# Patient Record
Sex: MALE | Race: WHITE | Employment: OTHER | URBAN - METROPOLITAN AREA
[De-identification: names, ages, dates, MRNs, and addresses within clinical notes are randomized per-mention and may not be internally consistent; named-entity substitution may affect disease eponyms.]

---

## 2017-10-31 ENCOUNTER — OFFICE VISIT (OUTPATIENT)
Dept: INTERNAL MEDICINE CLINIC | Facility: CLINIC | Age: 62
End: 2017-10-31

## 2017-10-31 VITALS
WEIGHT: 315 LBS | SYSTOLIC BLOOD PRESSURE: 126 MMHG | HEART RATE: 94 BPM | BODY MASS INDEX: 46.13 KG/M2 | RESPIRATION RATE: 20 BRPM | DIASTOLIC BLOOD PRESSURE: 74 MMHG | HEIGHT: 69.2 IN | TEMPERATURE: 98 F

## 2017-10-31 DIAGNOSIS — I10 ESSENTIAL HYPERTENSION: ICD-10-CM

## 2017-10-31 DIAGNOSIS — Z00.00 ROUTINE PHYSICAL EXAMINATION: Primary | ICD-10-CM

## 2017-10-31 DIAGNOSIS — Z12.11 COLON CANCER SCREENING: ICD-10-CM

## 2017-10-31 DIAGNOSIS — N48.1 BALANITIS: ICD-10-CM

## 2017-10-31 DIAGNOSIS — IMO0001 CLASS 3 OBESITY DUE TO EXCESS CALORIES WITHOUT SERIOUS COMORBIDITY WITH BODY MASS INDEX (BMI) OF 45.0 TO 49.9 IN ADULT: ICD-10-CM

## 2017-10-31 PROCEDURE — 99386 PREV VISIT NEW AGE 40-64: CPT | Performed by: INTERNAL MEDICINE

## 2017-10-31 RX ORDER — POTASSIUM CHLORIDE 20 MEQ/1
20 TABLET, EXTENDED RELEASE ORAL DAILY
COMMUNITY
Start: 2017-10-18 | End: 2018-02-01

## 2017-10-31 RX ORDER — FUROSEMIDE 40 MG/1
40 TABLET ORAL DAILY
COMMUNITY
Start: 2017-10-18 | End: 2018-02-01

## 2017-10-31 RX ORDER — LOSARTAN POTASSIUM AND HYDROCHLOROTHIAZIDE 25; 100 MG/1; MG/1
1 TABLET ORAL DAILY
COMMUNITY
Start: 2017-10-18 | End: 2018-02-01

## 2017-10-31 RX ORDER — FLUCONAZOLE 100 MG/1
100 TABLET ORAL DAILY
Qty: 5 TABLET | Refills: 1 | Status: SHIPPED | OUTPATIENT
Start: 2017-10-31 | End: 2017-11-05

## 2017-10-31 NOTE — PROGRESS NOTES
HPI:    Patient ID: Jovan Garcia is a 63year old male. HPI  Patient is here for his first office visit for physical exam and to establish care for follow-up on chronic medical issues as listed below.   Previously seen by different doctor who wa chills, fatigue and fever. HENT: Negative for hearing loss. Eyes: Negative for visual disturbance. Respiratory: Negative for cough and shortness of breath. Cardiovascular: Negative for chest pain and palpitations.    Gastrointestinal: Negative for Genitourinary Comments: Skin irritation on the shaft of the penis. The penis was somewhat retracted due to the obesity. Unable to fully work expose the head of the penis. The skin was irritated and cracked.    Musculoskeletal: He exhibits no tenderness 5 days of oral fluconazole. I do not think the topical treatment would be successful. Check for diabetes.       Meds This Visit:  No prescriptions requested or ordered in this encounter       Imaging & Referrals:  None         #[id number]

## 2017-11-03 ENCOUNTER — LAB ENCOUNTER (OUTPATIENT)
Dept: LAB | Age: 62
End: 2017-11-03
Attending: INTERNAL MEDICINE
Payer: COMMERCIAL

## 2017-11-03 DIAGNOSIS — Z00.00 ROUTINE PHYSICAL EXAMINATION: ICD-10-CM

## 2017-11-03 PROCEDURE — 80061 LIPID PANEL: CPT

## 2017-11-03 PROCEDURE — 82607 VITAMIN B-12: CPT

## 2017-11-03 PROCEDURE — 36415 COLL VENOUS BLD VENIPUNCTURE: CPT

## 2017-11-03 PROCEDURE — 85025 COMPLETE CBC W/AUTO DIFF WBC: CPT

## 2017-11-03 PROCEDURE — 83036 HEMOGLOBIN GLYCOSYLATED A1C: CPT

## 2017-11-03 PROCEDURE — 84443 ASSAY THYROID STIM HORMONE: CPT

## 2017-11-03 PROCEDURE — 80053 COMPREHEN METABOLIC PANEL: CPT

## 2018-01-22 ENCOUNTER — OFFICE VISIT (OUTPATIENT)
Dept: GASTROENTEROLOGY | Facility: CLINIC | Age: 63
End: 2018-01-22

## 2018-01-22 ENCOUNTER — TELEPHONE (OUTPATIENT)
Dept: GASTROENTEROLOGY | Facility: CLINIC | Age: 63
End: 2018-01-22

## 2018-01-22 VITALS
DIASTOLIC BLOOD PRESSURE: 84 MMHG | SYSTOLIC BLOOD PRESSURE: 129 MMHG | HEIGHT: 69 IN | WEIGHT: 315 LBS | HEART RATE: 82 BPM | BODY MASS INDEX: 46.65 KG/M2

## 2018-01-22 DIAGNOSIS — Z12.11 SCREEN FOR COLON CANCER: Primary | ICD-10-CM

## 2018-01-22 PROCEDURE — 99243 OFF/OP CNSLTJ NEW/EST LOW 30: CPT | Performed by: INTERNAL MEDICINE

## 2018-01-22 PROCEDURE — 99212 OFFICE O/P EST SF 10 MIN: CPT | Performed by: INTERNAL MEDICINE

## 2018-01-22 RX ORDER — POLYETHYLENE GLYCOL 3350, SODIUM CHLORIDE, SODIUM BICARBONATE, POTASSIUM CHLORIDE 420; 11.2; 5.72; 1.48 G/4L; G/4L; G/4L; G/4L
POWDER, FOR SOLUTION ORAL
Qty: 1 BOTTLE | Refills: 0 | Status: SHIPPED | OUTPATIENT
Start: 2018-01-22 | End: 2018-02-01

## 2018-01-22 NOTE — TELEPHONE ENCOUNTER
MACKI: Tiff or Saul Guzman ,Pt will call us back to schedule his Colonoscopy Procedure.     Scheduled for:  Colonoscopy 19649  Provider Name: Jayleen Dietz  Date:    Location:    Sedation:  Mac sedation  Time:    Prep: Split dose Trilyte  Meds/Allergies Reconciled?:

## 2018-01-22 NOTE — H&P
[address] [facility] [address] Gastroenterology                                                                                                  Clinic History and Physical     Pa sub-sternal chest pain  GASTROINTESTINAL:  see HPI  GENITOURINARY:  negative for dysuria or gross hematuria  INTEGUMENT/BREAST:  SKIN:  negative for jaundice   ALLERGIC/IMMUNOLOGIC:  negative for hay fever  ENDOCRINE:  negative for cold intolerance and hea demonstrated understanding], including but not limited to the risks of bleeding, infection, pain, death, as well as the risks of anesthesia and perforation all leading to prolonged hospitalization, surgical intervention, or even death.  I also specifically

## 2018-01-22 NOTE — PATIENT INSTRUCTIONS
1. Schedule colonoscopy with MAC (Screening)    2.  bowel prep from pharmacy (split trilyte)    3. Continue all medications for procedure    4. Read all bowel prep instructions carefully    5.  AVOID seeds, nuts, popcorn, raw fruits and vegetables (c

## 2018-01-24 ENCOUNTER — TELEPHONE (OUTPATIENT)
Dept: GASTROENTEROLOGY | Facility: CLINIC | Age: 63
End: 2018-01-24

## 2018-01-24 DIAGNOSIS — Z12.11 COLON CANCER SCREENING: Primary | ICD-10-CM

## 2018-01-24 NOTE — TELEPHONE ENCOUNTER
GI/RN--    This patient is scheduled (see below) and has [address]. Please call for prior auth, Thank you.

## 2018-01-24 NOTE — TELEPHONE ENCOUNTER
Scheduled for:  Colonoscopy 77515  Provider Name: Dr. Jenkins Room  Date:  2/20/18  Location:  [facility]  Sedation:  MAC  Time:  1500 (pt is aware to arrive at 1400)   Prep:  Trihunterte, mailed new instructions on 1/24/18 with codes  Meds/Allergies Reconciled?:  Physician

## 2018-01-24 NOTE — TELEPHONE ENCOUNTER
Spoke to [address] [facility]. From [place] at [phone] N  CPT 95727 Does not require pre-certification.

## 2018-02-01 ENCOUNTER — OFFICE VISIT (OUTPATIENT)
Dept: INTERNAL MEDICINE CLINIC | Facility: CLINIC | Age: 63
End: 2018-02-01

## 2018-02-01 VITALS
HEIGHT: 69 IN | SYSTOLIC BLOOD PRESSURE: 124 MMHG | HEART RATE: 90 BPM | WEIGHT: 315 LBS | RESPIRATION RATE: 16 BRPM | BODY MASS INDEX: 46.65 KG/M2 | TEMPERATURE: 98 F | DIASTOLIC BLOOD PRESSURE: 84 MMHG

## 2018-02-01 DIAGNOSIS — R73.09 ELEVATED GLUCOSE: ICD-10-CM

## 2018-02-01 DIAGNOSIS — IMO0001 CLASS 3 OBESITY DUE TO EXCESS CALORIES WITHOUT SERIOUS COMORBIDITY WITH BODY MASS INDEX (BMI) OF 45.0 TO 49.9 IN ADULT: ICD-10-CM

## 2018-02-01 DIAGNOSIS — I10 ESSENTIAL HYPERTENSION: Primary | ICD-10-CM

## 2018-02-01 PROCEDURE — 99214 OFFICE O/P EST MOD 30 MIN: CPT | Performed by: INTERNAL MEDICINE

## 2018-02-01 PROCEDURE — 99212 OFFICE O/P EST SF 10 MIN: CPT | Performed by: INTERNAL MEDICINE

## 2018-02-01 RX ORDER — LOSARTAN POTASSIUM AND HYDROCHLOROTHIAZIDE 25; 100 MG/1; MG/1
1 TABLET ORAL DAILY
Qty: 90 TABLET | Refills: 3 | Status: SHIPPED | OUTPATIENT
Start: 2018-02-01 | End: 2018-10-29

## 2018-02-01 NOTE — PROGRESS NOTES
HPI:    Patient ID: Eder Eng is a 63year old male.     HPI  Patient returns to the office today to discuss chronic medical issues which include Patient Active Problem List:     Essential hypertension     BMI 45.0-49.9, adult ([facility] [address]     Class 3 Review of Systems   Constitutional: Negative for chills, fatigue and fever. HENT: Negative for hearing loss. Eyes: Negative for visual disturbance. Respiratory: Negative for cough and shortness of breath.     Cardiovascular: Negativ well as at home. Continue current medications. Follow-up in 6 months.    (R73.09) Elevated glucose  Plan: GLUCOSE, SERUM, HEMOGLOBIN A1C         Blood sugar is elevated back in October. High risk for diabetes given obesity. A1c also elevated.   Discussed

## 2018-02-20 ENCOUNTER — HOSPITAL ENCOUNTER (OUTPATIENT)
Facility: HOSPITAL | Age: 63
Setting detail: HOSPITAL OUTPATIENT SURGERY
Discharge: HOME | End: 2018-02-20
Attending: INTERNAL MEDICINE | Admitting: INTERNAL MEDICINE
Payer: COMMERCIAL

## 2018-02-20 ENCOUNTER — ANESTHESIA (OUTPATIENT)
Dept: ENDOSCOPY | Facility: HOSPITAL | Age: 63
End: 2018-02-20
Payer: COMMERCIAL

## 2018-02-20 ENCOUNTER — SURGERY (OUTPATIENT)
Age: 63
End: 2018-02-20

## 2018-02-20 ENCOUNTER — ANESTHESIA EVENT (OUTPATIENT)
Dept: ENDOSCOPY | Facility: HOSPITAL | Age: 63
End: 2018-02-20
Payer: COMMERCIAL

## 2018-02-20 VITALS
HEART RATE: 69 BPM | WEIGHT: 312 LBS | RESPIRATION RATE: 17 BRPM | OXYGEN SATURATION: 95 % | BODY MASS INDEX: 46.21 KG/M2 | HEIGHT: 69 IN | SYSTOLIC BLOOD PRESSURE: 118 MMHG | TEMPERATURE: 98 F | DIASTOLIC BLOOD PRESSURE: 76 MMHG

## 2018-02-20 DIAGNOSIS — Q43.8 TORTUOUS COLON: ICD-10-CM

## 2018-02-20 DIAGNOSIS — K57.30 DIVERTICULOSIS LARGE INTESTINE W/O PERFORATION OR ABSCESS W/O BLEEDING: ICD-10-CM

## 2018-02-20 DIAGNOSIS — K63.5 POLYP OF TRANSVERSE COLON, UNSPECIFIED TYPE: Primary | ICD-10-CM

## 2018-02-20 DIAGNOSIS — Z12.11 COLON CANCER SCREENING: ICD-10-CM

## 2018-02-20 DIAGNOSIS — K63.5 POLYP OF SIGMOID COLON, UNSPECIFIED TYPE: ICD-10-CM

## 2018-02-20 PROCEDURE — 45385 COLONOSCOPY W/LESION REMOVAL: CPT | Performed by: INTERNAL MEDICINE

## 2018-02-20 PROCEDURE — 0DBL8ZX EXCISION OF TRANSVERSE COLON, VIA NATURAL OR ARTIFICIAL OPENING ENDOSCOPIC, DIAGNOSTIC: ICD-10-PCS | Performed by: INTERNAL MEDICINE

## 2018-02-20 RX ORDER — SODIUM CHLORIDE, SODIUM LACTATE, POTASSIUM CHLORIDE, CALCIUM CHLORIDE 600; 310; 30; 20 MG/100ML; MG/100ML; MG/100ML; MG/100ML
INJECTION, SOLUTION INTRAVENOUS CONTINUOUS
Status: DISCONTINUED | OUTPATIENT
Start: 2018-02-20 | End: 2018-02-20

## 2018-02-20 RX ORDER — NALOXONE HYDROCHLORIDE 0.4 MG/ML
80 INJECTION, SOLUTION INTRAMUSCULAR; INTRAVENOUS; SUBCUTANEOUS AS NEEDED
Status: DISCONTINUED | OUTPATIENT
Start: 2018-02-20 | End: 2018-02-20

## 2018-02-20 RX ORDER — LIDOCAINE HYDROCHLORIDE 10 MG/ML
INJECTION, SOLUTION EPIDURAL; INFILTRATION; INTRACAUDAL; PERINEURAL AS NEEDED
Status: DISCONTINUED | OUTPATIENT
Start: 2018-02-20 | End: 2018-02-20 | Stop reason: SURG

## 2018-02-20 RX ADMIN — LIDOCAINE HYDROCHLORIDE 50 MG: 10 INJECTION, SOLUTION EPIDURAL; INFILTRATION; INTRACAUDAL; PERINEURAL at 15:22:00

## 2018-02-20 RX ADMIN — SODIUM CHLORIDE, SODIUM LACTATE, POTASSIUM CHLORIDE, CALCIUM CHLORIDE: 600; 310; 30; 20 INJECTION, SOLUTION INTRAVENOUS at 15:20:00

## 2018-02-20 NOTE — ANESTHESIA POSTPROCEDURE EVALUATION
Patient: Karley Light    Procedure Summary     Date:  02/20/18 Room / Location:  [address] [place] [address] ENDOSCOPY 05 / [address] [place] [address] ENDOSCOPY    Anesthesia Start:  9389 Anesthesia Stop:  9170    Procedure:  COLONOSCOPY (N/A ) Diagnosis:       Colon cancer screening      (jessica

## 2018-02-20 NOTE — H&P
History & Physical Examination    Patient Name: John Phillips  MRN: [id number]  CSN: [id number]  YOB: 1955    Diagnosis: screening for colon cancer      Prescriptions Prior to Admission:  Losartan Potassium-HCTZ 100-25 MG Oral Tab Julia Lin

## 2018-02-20 NOTE — OPERATIVE REPORT
COLONOSCOPY REPORT    Fatoumata Turpin     1955 Age 63year old   PCP Jose Angel Heaton MD Endoscopist Luis Anderson MD     Date of procedure: 18    Procedure: Colonoscopy w/hot snare polypectomy    Pre-operative diagnosis: Mikel Machado retroflexed view of the rectum revealed small internal hemorrhoids. 5. The colonic mucosa throughout the colon showed normal vascular pattern, without evidence of angioectasias or inflammation. 6. GABE: normal rectal tone, no masses palpated.      Impr

## 2018-02-20 NOTE — ANESTHESIA PREPROCEDURE EVALUATION
Anesthesia PreOp Note    HPI:     Cherrie Portillo is a 63year old male who presents for preoperative consultation requested by: Giovani Rivero MD    Date of Surgery: 2/20/2018    Procedure(s):  COLONOSCOPY  Indication: Colon cancer screening FB  Neck ROM: full  Dental - normal exam     Pulmonary - negative ROS and normal exam   Cardiovascular - normal exam    Neuro/Psych - negative ROS     GI/Hepatic/Renal - negative ROS     Endo/Other - negative ROS   Abdominal              Anesthesia Plan:

## 2018-02-23 ENCOUNTER — TELEPHONE (OUTPATIENT)
Dept: GASTROENTEROLOGY | Facility: CLINIC | Age: 63
End: 2018-02-23

## 2018-02-23 NOTE — TELEPHONE ENCOUNTER
Message   Received:  Today   Message Contents   Edgard De Jesus MD  [facility] Clinical Staff             GI staff: please place recall for colonoscopy in 3 years      Letter mailed and recall entered for 3 years

## 2018-03-09 ENCOUNTER — APPOINTMENT (OUTPATIENT)
Dept: LAB | Age: 63
End: 2018-03-09
Attending: INTERNAL MEDICINE
Payer: COMMERCIAL

## 2018-03-09 DIAGNOSIS — R73.09 ELEVATED GLUCOSE: ICD-10-CM

## 2018-03-09 LAB
GLUCOSE SERPL-MCNC: 97 MG/DL (ref 70–99)
HBA1C MFR BLD: 6.6 % (ref 4–6)

## 2018-03-09 PROCEDURE — 82947 ASSAY GLUCOSE BLOOD QUANT: CPT

## 2018-03-09 PROCEDURE — 36415 COLL VENOUS BLD VENIPUNCTURE: CPT

## 2018-03-09 PROCEDURE — 83036 HEMOGLOBIN GLYCOSYLATED A1C: CPT

## 2018-10-29 ENCOUNTER — OFFICE VISIT (OUTPATIENT)
Dept: INTERNAL MEDICINE CLINIC | Facility: CLINIC | Age: 63
End: 2018-10-29
Payer: COMMERCIAL

## 2018-10-29 VITALS
HEART RATE: 88 BPM | SYSTOLIC BLOOD PRESSURE: 138 MMHG | TEMPERATURE: 99 F | BODY MASS INDEX: 46.65 KG/M2 | HEIGHT: 69 IN | WEIGHT: 315 LBS | RESPIRATION RATE: 20 BRPM | DIASTOLIC BLOOD PRESSURE: 88 MMHG

## 2018-10-29 DIAGNOSIS — R73.09 ELEVATED GLUCOSE: ICD-10-CM

## 2018-10-29 DIAGNOSIS — E66.01 CLASS 3 SEVERE OBESITY WITHOUT SERIOUS COMORBIDITY WITH BODY MASS INDEX (BMI) OF 45.0 TO 49.9 IN ADULT, UNSPECIFIED OBESITY TYPE (HCC): ICD-10-CM

## 2018-10-29 DIAGNOSIS — N48.1 BALANITIS: ICD-10-CM

## 2018-10-29 DIAGNOSIS — Z00.00 ROUTINE PHYSICAL EXAMINATION: Primary | ICD-10-CM

## 2018-10-29 DIAGNOSIS — I10 ESSENTIAL HYPERTENSION: ICD-10-CM

## 2018-10-29 PROCEDURE — 99396 PREV VISIT EST AGE 40-64: CPT | Performed by: INTERNAL MEDICINE

## 2018-10-29 RX ORDER — LOSARTAN POTASSIUM AND HYDROCHLOROTHIAZIDE 25; 100 MG/1; MG/1
1 TABLET ORAL DAILY
Qty: 90 TABLET | Refills: 3 | Status: SHIPPED | OUTPATIENT
Start: 2018-10-29 | End: 2019-10-27

## 2018-10-29 RX ORDER — FLUCONAZOLE 100 MG/1
100 TABLET ORAL DAILY
Qty: 7 TABLET | Refills: 1 | Status: SHIPPED | OUTPATIENT
Start: 2018-10-29 | End: 2018-11-05

## 2018-10-29 NOTE — PROGRESS NOTES
HPI:    Patient ID: Karley Schmidt is a 66year old male. HPI  Patient is here requesting physical exam and follow-up on chronic medical issues as listed below. Last seen in the office earlier this year there are no changes made at that time.   [place]ing [place] History    Tobacco Use      Smoking status: Never Smoker      Smokeless tobacco: Never Used    Alcohol use: Yes      Comment: rarely    Drug use: No           Review of Systems   Constitutional: Negative for chills, fatigue and fever.    HENT: Negative for Genitourinary: Rectum normal, prostate normal and testes normal. Uncircumcised. Genitourinary Comments: Foreskin- irritated and cracked   Musculoskeletal: He exhibits no tenderness. Lymphadenopathy:     He has no cervical adenopathy.         Right: No Losartan Potassium-HCTZ 100-25 MG Oral Tab 90 tablet 3     Sig: Take 1 tablet by mouth daily.        Imaging & Referrals:  None         BB#[id number]

## 2018-11-09 ENCOUNTER — LAB ENCOUNTER (OUTPATIENT)
Dept: LAB | Age: 63
End: 2018-11-09
Attending: INTERNAL MEDICINE
Payer: COMMERCIAL

## 2018-11-09 DIAGNOSIS — Z00.00 ROUTINE PHYSICAL EXAMINATION: ICD-10-CM

## 2018-11-09 PROCEDURE — 36415 COLL VENOUS BLD VENIPUNCTURE: CPT

## 2018-11-09 PROCEDURE — 80053 COMPREHEN METABOLIC PANEL: CPT

## 2018-11-09 PROCEDURE — 83036 HEMOGLOBIN GLYCOSYLATED A1C: CPT

## 2018-11-09 PROCEDURE — 80061 LIPID PANEL: CPT

## 2018-11-09 PROCEDURE — 82607 VITAMIN B-12: CPT

## 2018-11-09 PROCEDURE — 84443 ASSAY THYROID STIM HORMONE: CPT

## 2018-11-09 PROCEDURE — 85025 COMPLETE CBC W/AUTO DIFF WBC: CPT

## 2019-10-29 RX ORDER — LOSARTAN POTASSIUM AND HYDROCHLOROTHIAZIDE 25; 100 MG/1; MG/1
TABLET ORAL
Qty: 90 TABLET | Refills: 1 | Status: SHIPPED | OUTPATIENT
Start: 2019-10-29 | End: 2020-05-25

## 2019-10-30 ENCOUNTER — OFFICE VISIT (OUTPATIENT)
Dept: INTERNAL MEDICINE CLINIC | Facility: CLINIC | Age: 64
End: 2019-10-30
Payer: COMMERCIAL

## 2019-10-30 VITALS
DIASTOLIC BLOOD PRESSURE: 88 MMHG | HEIGHT: 69 IN | RESPIRATION RATE: 20 BRPM | BODY MASS INDEX: 46.65 KG/M2 | HEART RATE: 92 BPM | SYSTOLIC BLOOD PRESSURE: 150 MMHG | TEMPERATURE: 98 F | WEIGHT: 315 LBS

## 2019-10-30 DIAGNOSIS — R73.09 ELEVATED GLUCOSE: ICD-10-CM

## 2019-10-30 DIAGNOSIS — I10 ESSENTIAL HYPERTENSION: ICD-10-CM

## 2019-10-30 DIAGNOSIS — E66.01 CLASS 3 SEVERE OBESITY WITHOUT SERIOUS COMORBIDITY WITH BODY MASS INDEX (BMI) OF 45.0 TO 49.9 IN ADULT, UNSPECIFIED OBESITY TYPE (HCC): ICD-10-CM

## 2019-10-30 DIAGNOSIS — Z23 NEED FOR VACCINATION: ICD-10-CM

## 2019-10-30 DIAGNOSIS — Z00.00 ROUTINE PHYSICAL EXAMINATION: Primary | ICD-10-CM

## 2019-10-30 PROCEDURE — 90471 IMMUNIZATION ADMIN: CPT | Performed by: INTERNAL MEDICINE

## 2019-10-30 PROCEDURE — 90686 IIV4 VACC NO PRSV 0.5 ML IM: CPT | Performed by: INTERNAL MEDICINE

## 2019-10-30 PROCEDURE — 99396 PREV VISIT EST AGE 40-64: CPT | Performed by: INTERNAL MEDICINE

## 2019-10-30 RX ORDER — ATOVAQUONE AND PROGUANIL HYDROCHLORIDE 250; 100 MG/1; MG/1
TABLET, FILM COATED ORAL
Refills: 0 | COMMUNITY
Start: 2019-06-11 | End: 2019-10-30 | Stop reason: ALTCHOICE

## 2019-10-30 RX ORDER — AZITHROMYCIN 500 MG/1
TABLET, FILM COATED ORAL
Refills: 0 | COMMUNITY
Start: 2019-06-11 | End: 2019-10-30 | Stop reason: ALTCHOICE

## 2019-10-30 NOTE — TELEPHONE ENCOUNTER
Refill passed per [facility] protocol.   Hypertensive Medications  Protocol Criteria:  · Appointment scheduled in the past 6 months or in the next 3 months  · BMP or CMP in the past 12 months  · Creatinine result < 2  Recent Outpatient Visits

## 2019-10-30 NOTE — PROGRESS NOTES
HPI:    Patient ID: Juan Rene is a 60year old male. HPI  Patient is here requesting general physical exam and follow-up of hypertension and obesity seen here 1 year ago. Time showed blood sugar 117 and A1c of 6.5.   Never had an official di constipation. Genitourinary: Negative for dysuria, hematuria and sexual dysfunction. Musculoskeletal: Positive for joint pain. Skin: Negative for rash. Neurological: Negative for weakness, numbness and headaches.    Hematological: Does not bruise/bl Neurological: He is alert. No cranial nerve deficit. Coordination normal.   Skin: Skin is warm and dry. No rash noted. Psychiatric: He has a normal mood and affect.  His behavior is normal. Judgment and thought content normal.       Wt Readings from [facility] that in the future     5. Need for vaccination  Vaccination status reviewed.   Given flu shot today.  - FLULAVAL INFLUENZA VACCINE QUAD PRESERVATIVE FREE 0.5 ML      Meds This Visit:  Requested Prescriptions      No prescriptions requested or ordered in thi

## 2019-11-04 ENCOUNTER — LAB ENCOUNTER (OUTPATIENT)
Dept: LAB | Age: 64
End: 2019-11-04
Attending: INTERNAL MEDICINE
Payer: COMMERCIAL

## 2019-11-04 DIAGNOSIS — Z00.00 ROUTINE PHYSICAL EXAMINATION: ICD-10-CM

## 2019-11-04 PROCEDURE — 84443 ASSAY THYROID STIM HORMONE: CPT

## 2019-11-04 PROCEDURE — 80053 COMPREHEN METABOLIC PANEL: CPT

## 2019-11-04 PROCEDURE — 83036 HEMOGLOBIN GLYCOSYLATED A1C: CPT

## 2019-11-04 PROCEDURE — 85025 COMPLETE CBC W/AUTO DIFF WBC: CPT

## 2019-11-04 PROCEDURE — 36415 COLL VENOUS BLD VENIPUNCTURE: CPT

## 2019-11-04 PROCEDURE — 80061 LIPID PANEL: CPT

## 2020-02-07 ENCOUNTER — OFFICE VISIT (OUTPATIENT)
Dept: INTERNAL MEDICINE CLINIC | Facility: CLINIC | Age: 65
End: 2020-02-07
Payer: COMMERCIAL

## 2020-02-07 VITALS
WEIGHT: 315 LBS | DIASTOLIC BLOOD PRESSURE: 92 MMHG | RESPIRATION RATE: 20 BRPM | SYSTOLIC BLOOD PRESSURE: 150 MMHG | BODY MASS INDEX: 46.65 KG/M2 | TEMPERATURE: 98 F | HEART RATE: 90 BPM | HEIGHT: 69 IN

## 2020-02-07 DIAGNOSIS — I10 ESSENTIAL HYPERTENSION: Primary | ICD-10-CM

## 2020-02-07 DIAGNOSIS — R73.09 ELEVATED GLUCOSE: ICD-10-CM

## 2020-02-07 PROCEDURE — 99214 OFFICE O/P EST MOD 30 MIN: CPT | Performed by: INTERNAL MEDICINE

## 2020-02-07 RX ORDER — AMLODIPINE BESYLATE 5 MG/1
5 TABLET ORAL DAILY
Qty: 90 TABLET | Refills: 3 | Status: SHIPPED | OUTPATIENT
Start: 2020-02-07 | End: 2020-04-08

## 2020-02-07 NOTE — PROGRESS NOTES
HPI:    Patient ID: Yarelis Cunha is a 60year old male. HPI  Patient is here for follow-up on chronic medical problems as listed below. Last seen here in October 30. Blood pressure elevated at that time. We did not make any changes.   Full bl dysfunction. Musculoskeletal: Negative for joint pain. Skin: Negative for rash. Neurological: Negative for weakness, numbness and headaches. Hematological: Does not bruise/bleed easily. Psychiatric/Behavioral: Negative for depressed mood.  The pat Continue losartan hydrochlorothiazide. Add amlodipine 5 mg once a day. Work on diet and exercise. Follow-up in 3 months. 2. Elevated glucose  Hold off on any treatment for now. Work on weight loss. No definite diagnosis of diabetes.     3. BMI 45.0-

## 2020-03-11 ENCOUNTER — APPOINTMENT (OUTPATIENT)
Dept: GENERAL RADIOLOGY | Facility: HOSPITAL | Age: 65
End: 2020-03-11
Attending: EMERGENCY MEDICINE
Payer: COMMERCIAL

## 2020-03-11 ENCOUNTER — APPOINTMENT (OUTPATIENT)
Dept: CT IMAGING | Facility: HOSPITAL | Age: 65
End: 2020-03-11
Attending: EMERGENCY MEDICINE
Payer: COMMERCIAL

## 2020-03-11 ENCOUNTER — HOSPITAL ENCOUNTER (OUTPATIENT)
Facility: HOSPITAL | Age: 65
Setting detail: OBSERVATION
Discharge: HOME | End: 2020-03-12
Attending: EMERGENCY MEDICINE | Admitting: HOSPITALIST
Payer: COMMERCIAL

## 2020-03-11 DIAGNOSIS — R07.9 ACUTE CHEST PAIN: Primary | ICD-10-CM

## 2020-03-11 LAB
ANION GAP SERPL CALC-SCNC: 7 MMOL/L (ref 0–18)
BASOPHILS # BLD AUTO: 0.05 X10(3) UL (ref 0–0.2)
BASOPHILS NFR BLD AUTO: 0.5 %
BUN BLD-MCNC: 20 MG/DL (ref 7–18)
BUN/CREAT SERPL: 15.6 (ref 10–20)
CALCIUM BLD-MCNC: 8.8 MG/DL (ref 8.5–10.1)
CHLORIDE SERPL-SCNC: 103 MMOL/L (ref 98–112)
CO2 SERPL-SCNC: 27 MMOL/L (ref 21–32)
CREAT BLD-MCNC: 1.28 MG/DL (ref 0.7–1.3)
D DIMER PPP FEU-MCNC: 0.67 UG/ML FEU (ref ?–0.62)
DEPRECATED RDW RBC AUTO: 43.9 FL (ref 35.1–46.3)
EOSINOPHIL # BLD AUTO: 0.23 X10(3) UL (ref 0–0.7)
EOSINOPHIL NFR BLD AUTO: 2.3 %
ERYTHROCYTE [DISTWIDTH] IN BLOOD BY AUTOMATED COUNT: 13 % (ref 11–15)
GLUCOSE BLD-MCNC: 237 MG/DL (ref 70–99)
HCT VFR BLD AUTO: 44.4 % (ref 39–53)
HGB BLD-MCNC: 15 G/DL (ref 13–17.5)
IMM GRANULOCYTES # BLD AUTO: 0.03 X10(3) UL (ref 0–1)
IMM GRANULOCYTES NFR BLD: 0.3 %
LYMPHOCYTES # BLD AUTO: 1.36 X10(3) UL (ref 1–4)
LYMPHOCYTES NFR BLD AUTO: 13.7 %
MCH RBC QN AUTO: 31.2 PG (ref 26–34)
MCHC RBC AUTO-ENTMCNC: 33.8 G/DL (ref 31–37)
MCV RBC AUTO: 92.3 FL (ref 80–100)
MONOCYTES # BLD AUTO: 0.63 X10(3) UL (ref 0.1–1)
MONOCYTES NFR BLD AUTO: 6.4 %
NEUTROPHILS # BLD AUTO: 7.61 X10 (3) UL (ref 1.5–7.7)
NEUTROPHILS # BLD AUTO: 7.61 X10(3) UL (ref 1.5–7.7)
NEUTROPHILS NFR BLD AUTO: 76.8 %
OSMOLALITY SERPL CALC.SUM OF ELEC: 294 MOSM/KG (ref 275–295)
PLATELET # BLD AUTO: 145 10(3)UL (ref 150–450)
POTASSIUM SERPL-SCNC: 3.6 MMOL/L (ref 3.5–5.1)
RBC # BLD AUTO: 4.81 X10(6)UL (ref 4.3–5.7)
SODIUM SERPL-SCNC: 137 MMOL/L (ref 136–145)
TROPONIN I SERPL-MCNC: <0.045 NG/ML (ref ?–0.04)
WBC # BLD AUTO: 9.9 X10(3) UL (ref 4–11)

## 2020-03-11 PROCEDURE — 71260 CT THORAX DX C+: CPT | Performed by: EMERGENCY MEDICINE

## 2020-03-11 PROCEDURE — 99220 INITIAL OBSERVATION CARE,LEVL III: CPT | Performed by: HOSPITALIST

## 2020-03-11 PROCEDURE — 71046 X-RAY EXAM CHEST 2 VIEWS: CPT | Performed by: EMERGENCY MEDICINE

## 2020-03-11 RX ORDER — ASPIRIN 81 MG/1
324 TABLET, CHEWABLE ORAL ONCE
Status: COMPLETED | OUTPATIENT
Start: 2020-03-11 | End: 2020-03-11

## 2020-03-12 ENCOUNTER — APPOINTMENT (OUTPATIENT)
Dept: NUCLEAR MEDICINE | Facility: HOSPITAL | Age: 65
End: 2020-03-12
Attending: HOSPITALIST
Payer: COMMERCIAL

## 2020-03-12 ENCOUNTER — APPOINTMENT (OUTPATIENT)
Dept: CV DIAGNOSTICS | Facility: HOSPITAL | Age: 65
End: 2020-03-12
Attending: HOSPITALIST
Payer: COMMERCIAL

## 2020-03-12 ENCOUNTER — APPOINTMENT (OUTPATIENT)
Dept: GENERAL RADIOLOGY | Facility: HOSPITAL | Age: 65
End: 2020-03-12
Attending: HOSPITALIST
Payer: COMMERCIAL

## 2020-03-12 VITALS
OXYGEN SATURATION: 96 % | RESPIRATION RATE: 16 BRPM | WEIGHT: 315 LBS | SYSTOLIC BLOOD PRESSURE: 133 MMHG | HEART RATE: 92 BPM | HEIGHT: 69 IN | TEMPERATURE: 99 F | DIASTOLIC BLOOD PRESSURE: 69 MMHG | BODY MASS INDEX: 46.65 KG/M2

## 2020-03-12 LAB
EST. AVERAGE GLUCOSE BLD GHB EST-MCNC: 157 MG/DL (ref 68–126)
GLUCOSE BLDC GLUCOMTR-MCNC: 182 MG/DL (ref 70–99)
GLUCOSE BLDC GLUCOMTR-MCNC: 196 MG/DL (ref 70–99)
HBA1C MFR BLD HPLC: 7.1 % (ref ?–5.7)
TROPONIN I SERPL-MCNC: <0.045 NG/ML (ref ?–0.04)

## 2020-03-12 PROCEDURE — 93017 CV STRESS TEST TRACING ONLY: CPT | Performed by: HOSPITALIST

## 2020-03-12 PROCEDURE — 99217 OBSERVATION CARE DISCHARGE: CPT | Performed by: NURSE PRACTITIONER

## 2020-03-12 PROCEDURE — 78452 HT MUSCLE IMAGE SPECT MULT: CPT | Performed by: HOSPITALIST

## 2020-03-12 PROCEDURE — 74246 X-RAY XM UPR GI TRC 2CNTRST: CPT | Performed by: HOSPITALIST

## 2020-03-12 RX ORDER — HEPARIN SODIUM 5000 [USP'U]/ML
5000 INJECTION, SOLUTION INTRAVENOUS; SUBCUTANEOUS EVERY 12 HOURS
Status: DISCONTINUED | OUTPATIENT
Start: 2020-03-12 | End: 2020-03-12

## 2020-03-12 RX ORDER — ONDANSETRON 2 MG/ML
4 INJECTION INTRAMUSCULAR; INTRAVENOUS EVERY 6 HOURS PRN
Status: DISCONTINUED | OUTPATIENT
Start: 2020-03-12 | End: 2020-03-12

## 2020-03-12 RX ORDER — DEXTROSE MONOHYDRATE 25 G/50ML
50 INJECTION, SOLUTION INTRAVENOUS
Status: DISCONTINUED | OUTPATIENT
Start: 2020-03-12 | End: 2020-03-12

## 2020-03-12 RX ORDER — PANTOPRAZOLE SODIUM 40 MG/1
40 TABLET, DELAYED RELEASE ORAL
Status: DISCONTINUED | OUTPATIENT
Start: 2020-03-12 | End: 2020-03-12

## 2020-03-12 RX ORDER — HYDRALAZINE HYDROCHLORIDE 20 MG/ML
10 INJECTION INTRAMUSCULAR; INTRAVENOUS EVERY 4 HOURS PRN
Status: DISCONTINUED | OUTPATIENT
Start: 2020-03-12 | End: 2020-03-12

## 2020-03-12 RX ORDER — POTASSIUM CHLORIDE 20 MEQ/1
40 TABLET, EXTENDED RELEASE ORAL EVERY 4 HOURS
Status: DISCONTINUED | OUTPATIENT
Start: 2020-03-12 | End: 2020-03-12

## 2020-03-12 RX ORDER — ACETAMINOPHEN 325 MG/1
650 TABLET ORAL EVERY 6 HOURS PRN
Status: DISCONTINUED | OUTPATIENT
Start: 2020-03-12 | End: 2020-03-12

## 2020-03-12 RX ORDER — AMLODIPINE BESYLATE 5 MG/1
5 TABLET ORAL DAILY
Status: DISCONTINUED | OUTPATIENT
Start: 2020-03-12 | End: 2020-03-12

## 2020-03-12 NOTE — ED INITIAL ASSESSMENT (HPI)
Patient presents with chest pain that began Sunday, resolved and returned again today. Patient reports dyspnea with exertion as well.

## 2020-03-12 NOTE — PLAN OF CARE
Pt admitted for CP/SOB over last several days. Troponins negative thus far, d dimer slightly elevated with negative CT for PE. Pt denies CP or SOB at this time on the floor. Stress test ordered for AM, pt aware. All questions answered. . Self care, ambulate

## 2020-03-12 NOTE — ED PROVIDER NOTES
Patient Seen in: [facility] AND [facility] Emergency Department    History   Patient presents with:  Chest Pain  Dyspnea JOANN SOB    Stated Complaint: chest pain     HPI    Is here with a sensation of discomfort in the middle upper part of his chest radiates a bi None (Room air)       Current:/69 (BP Location: Right arm)   Pulse 92   Temp 98.5 °F (36.9 °C) (Oral)   Resp 16   Ht 175.3 cm (5' 9\")   Wt (!) 147.7 kg   SpO2 96%   BMI 48.10 kg/m²         Physical Exam  Constitutional:  Alert, well nourished adult Estimated Average Glucose 157 (*)     All other components within normal limits   POCT GLUCOSE - Abnormal; Notable for the following components:    POC Glucose  196 (*)     All other components within normal limits   POCT GLUCOSE - Abnormal; Notable for th

## 2020-03-12 NOTE — H&P
[facility] Patient Status:  Emergency    1955 MRN [id number]   Location [address] Attending Rodger Smith MD   Hosp Day # 0 PCP Nohelia Payne MD 1 tablet (5 mg total) by mouth daily. Facility-Administered Medications: None       Review of Systems:  Constitutional:  Weakness, Fatigue. Eye:  Negative. Ear/Nose/Mouth/Throat:  Negative.   Respiratory:  Negative  Cardiovascular: Substernal chest d 03/11/2020     03/11/2020    CA 8.8 03/11/2020    DDIMER 0.67 03/11/2020    TROP <0.045 03/11/2020       Imaging:  Xr Chest Pa + Lat Chest (cpt=71046)    Result Date: 3/11/2020  CONCLUSION: Mild linear bibasilar opacity probably mild linear scarring

## 2020-03-12 NOTE — PROGRESS NOTES
[facility]    Progress Note    John Phillips Patient Status:  Observation    1955 MRN [id number]   Location Tracie Bull Attending Cori Courtney MD   Hosp Day # 0 PCP So Ureña MD        Subjective:     C exercise along with lifestyle modifications.   Consider CPAP at night.     Prophylaxis  Subcutaneous heparin     CODE STATUS  Full     Dispo: pending       Results:     Lab Results   Component Value Date    WBC 9.9 03/11/2020    HGB 15.0 03/11/2020    HCT 4 chest.  Few noncalcified pulmonary micro nodules probably represent noncalcified granulomas. 5. Hepatic steatosis. 6. Small hiatal hernia. 7. Coronary artery calcification. No major discrepancy with preliminary [facility] radiology report.   Dictated by (CST

## 2020-03-12 NOTE — ED NOTES
Chest pain starting this evening after dinner. States he felt fine at baseball practice but felt an increasing discomfort across his midsternum after dinner. States it hurts more laying and sitting and when taking deep breaths.

## 2020-03-12 NOTE — CONSULTS
[facility] HOSP - [facility]    Cardiology Consultation  [facility]    Manny Mcintyre Patient Status:  Observation    1955 MRN [id number]   Location [address] [place] [address] Attending Josi Amaro MD   Hosp Day # 0 PCP early heart disease  No family history on file.     Social History  Non-smoker, retired  Patient Guardians:  Not on file    Other Topics            Concern    None on file    Social History Narrative    None on file            Current Medications:  amLODIPi kg)   03/12/20 0053 — — — 101 — — — —   03/12/20 0049 144/69 — — 100 20 96 % — —   03/12/20 0030 127/66 — — 102 21 94 % — —   03/12/20 0015 125/63 — — 103 22 95 % — —   03/12/20 0000 115/61 — — 99 26 93 % — —   03/11/20 2345 141/60 — — 101 26 94 % — —   03 03/11/2020    CO2 27.0 03/11/2020     (H) 03/11/2020    CA 8.8 03/11/2020    ALB 3.8 11/04/2019    ALKPHO 62 11/04/2019    TP 7.3 11/04/2019    AST 18 11/04/2019    ALT 41 11/04/2019    TSH 0.860 11/04/2019    PSA 2.8 11/09/2018    DDIMER 0.67 (H) 0 granulomatous disease in the chest.  Few noncalcified pulmonary micro nodules probably represent noncalcified granulomas. 5. Hepatic steatosis. 6. Small hiatal hernia. 7. Coronary artery calcification.     No major discrepancy with preliminary [facility] radiol

## 2020-03-12 NOTE — ED NOTES
Patient and family updated on plan of care- admission to 510. Report given to [facility]. Patient stable at this time.

## 2020-03-12 NOTE — ED NOTES
Assumed care of patient at [phone]. Patient and family updated on plan of care, awaiting bed for admission. ASA given. Warm blanket provided.

## 2020-03-13 ENCOUNTER — PATIENT OUTREACH (OUTPATIENT)
Dept: CASE MANAGEMENT | Age: 65
End: 2020-03-13

## 2020-03-13 DIAGNOSIS — Z02.9 ENCOUNTERS FOR UNSPECIFIED ADMINISTRATIVE PURPOSE: ICD-10-CM

## 2020-03-13 NOTE — DISCHARGE SUMMARY
[facility]    Discharge Summary    Gertrude Campbell Patient Status:  Observation    1955 MRN [id number]   Location [address] [facility] Attending No att. providers found   Hosp Day # 0 PCP Michelle Humphries MD     Date of Admissio out of 10 at its worst nonradiating in nature.     Hospital Course:   Chest pain possible ACS  Serial troponins have been negative EKG with no acute changes  - stress test normal  - D-Dimer elevated, CT no acute PE  - cards consult, no further workup, does

## 2020-03-16 ENCOUNTER — OFFICE VISIT (OUTPATIENT)
Dept: INTERNAL MEDICINE CLINIC | Facility: CLINIC | Age: 65
End: 2020-03-16
Payer: COMMERCIAL

## 2020-03-16 VITALS
HEIGHT: 69 IN | HEART RATE: 94 BPM | RESPIRATION RATE: 20 BRPM | BODY MASS INDEX: 46.65 KG/M2 | SYSTOLIC BLOOD PRESSURE: 136 MMHG | TEMPERATURE: 99 F | DIASTOLIC BLOOD PRESSURE: 86 MMHG | WEIGHT: 315 LBS

## 2020-03-16 DIAGNOSIS — R07.9 CHEST PAIN, UNSPECIFIED TYPE: Primary | ICD-10-CM

## 2020-03-16 DIAGNOSIS — R73.09 ELEVATED GLUCOSE: ICD-10-CM

## 2020-03-16 DIAGNOSIS — I10 ESSENTIAL HYPERTENSION: ICD-10-CM

## 2020-03-16 PROCEDURE — 1111F DSCHRG MED/CURRENT MED MERGE: CPT | Performed by: INTERNAL MEDICINE

## 2020-03-16 PROCEDURE — 99495 TRANSJ CARE MGMT MOD F2F 14D: CPT | Performed by: INTERNAL MEDICINE

## 2020-03-16 NOTE — PROGRESS NOTES
HPI:    Wyatt Melchor is a 60year old male here today for hospital follow up.    He was discharged from Inpatient hospital, [facility] AND [facility]  to Home   Admission Date: 3/11/20   Discharge Date: 3/12/20  Hospital Discharge Diagnoses (since 2/15/2 about 125-135/80-85. Weight is down a few pounds. Allergies:  He is allergic to bee pollen. Current Meds:  amLODIPine Besylate 5 MG Oral Tab, Take 1 tablet (5 mg total) by mouth daily.   LOSARTAN POTASSIUM-HCTZ 100-25 MG Oral Tab, TAKE 1 TABLET DAILY Exam    Constitutional: He appears well-developed and well-nourished. HENT:   Nose: Nose normal.   Cardiovascular: Normal rate, regular rhythm, normal heart sounds and intact distal pulses. Exam reveals no gallop and no friction rub. No murmur heard. Morbidity, and/or Mortality: moderate    Overall Risk:   moderate    Patient seen within 7 days from date of discharge.      Ellen Spencer MD, 3/16/2020

## 2020-04-06 ENCOUNTER — TELEPHONE (OUTPATIENT)
Dept: INTERNAL MEDICINE CLINIC | Facility: CLINIC | Age: 65
End: 2020-04-06

## 2020-04-06 NOTE — TELEPHONE ENCOUNTER
90 day supply_ Requesting 4 refills    Current Outpatient Medications   Medication Sig Dispense Refill   • amLODIPine Besylate 5 MG Oral Tab Take 1 tablet (5 mg total) by mouth daily.  90 tablet 3

## 2020-04-08 RX ORDER — AMLODIPINE BESYLATE 5 MG/1
5 TABLET ORAL DAILY
Qty: 90 TABLET | Refills: 3 | Status: SHIPPED | OUTPATIENT
Start: 2020-04-08 | End: 2021-03-11

## 2020-04-08 NOTE — TELEPHONE ENCOUNTER
Reordered    90 day supply_ Requesting 4 refills            Current Outpatient Medications   Medication Sig Dispense Refill   • amLODIPine Besylate 5 MG Oral Tab Take 1 tablet (5 mg total) by mouth daily.  90 tablet 3      BOB Manriquez  Working with

## 2020-05-25 RX ORDER — LOSARTAN POTASSIUM AND HYDROCHLOROTHIAZIDE 25; 100 MG/1; MG/1
TABLET ORAL
Qty: 90 TABLET | Refills: 3 | Status: SHIPPED | OUTPATIENT
Start: 2020-05-25 | End: 2021-05-20

## 2020-12-02 ENCOUNTER — PATIENT MESSAGE (OUTPATIENT)
Dept: INTERNAL MEDICINE CLINIC | Facility: CLINIC | Age: 65
End: 2020-12-02

## 2020-12-02 DIAGNOSIS — Z00.00 WELLNESS EXAMINATION: Primary | ICD-10-CM

## 2020-12-02 NOTE — TELEPHONE ENCOUNTER
Dr Kayleen Mane patient's message and advise. Pended labs for approval,last annual blood tests were done on 11/4/2019, there are an existing serum glucose and AIC orders since 3/16/20 BUT dx code for elevated glucose and not for annual wellness.      Chilo

## 2020-12-07 ENCOUNTER — LAB ENCOUNTER (OUTPATIENT)
Dept: LAB | Age: 65
End: 2020-12-07
Attending: NURSE PRACTITIONER
Payer: COMMERCIAL

## 2020-12-07 DIAGNOSIS — Z00.00 WELLNESS EXAMINATION: ICD-10-CM

## 2020-12-07 PROCEDURE — 85025 COMPLETE CBC W/AUTO DIFF WBC: CPT

## 2020-12-07 PROCEDURE — 82043 UR ALBUMIN QUANTITATIVE: CPT

## 2020-12-07 PROCEDURE — 82306 VITAMIN D 25 HYDROXY: CPT

## 2020-12-07 PROCEDURE — 83036 HEMOGLOBIN GLYCOSYLATED A1C: CPT

## 2020-12-07 PROCEDURE — 80061 LIPID PANEL: CPT

## 2020-12-07 PROCEDURE — 84443 ASSAY THYROID STIM HORMONE: CPT

## 2020-12-07 PROCEDURE — 80053 COMPREHEN METABOLIC PANEL: CPT

## 2020-12-07 PROCEDURE — 82570 ASSAY OF URINE CREATININE: CPT

## 2020-12-07 PROCEDURE — 36415 COLL VENOUS BLD VENIPUNCTURE: CPT

## 2020-12-10 NOTE — PROGRESS NOTES
HPI:    Patient ID: Yarelis Cunha is a 63year old male. HPI  Patient is here requesting a physical exam and follow-up on chronic medical issues as listed below. Last seen here in March for hospital follow-up with episode of chest pain.   Work-u POTASSIUM-HCTZ 100-25 MG Oral Tab TAKE 1 TABLET DAILY 90 tablet 3   • amLODIPine Besylate 5 MG Oral Tab Take 1 tablet (5 mg total) by mouth daily.  90 tablet 3     Allergies:  Bee Pollen              SWELLING     PHYSICAL EXAM:   /82 (BP Location: Lef 325 lb 12.8 oz (147.8 kg)  10/30/19 : (!) 321 lb 12.8 oz (146 kg)  10/29/18 : (!) 319 lb 14.4 oz (145.1 kg)            ASSESSMENT/PLAN:   1. Routine physical examination   Physical exam remarkable for obesity. Otherwise normal.  Active issues as below.   H

## 2020-12-11 ENCOUNTER — OFFICE VISIT (OUTPATIENT)
Dept: INTERNAL MEDICINE CLINIC | Facility: CLINIC | Age: 65
End: 2020-12-11
Payer: COMMERCIAL

## 2020-12-11 VITALS
DIASTOLIC BLOOD PRESSURE: 78 MMHG | HEIGHT: 69 IN | HEART RATE: 87 BPM | WEIGHT: 315 LBS | BODY MASS INDEX: 46.65 KG/M2 | SYSTOLIC BLOOD PRESSURE: 124 MMHG

## 2020-12-11 DIAGNOSIS — I10 ESSENTIAL HYPERTENSION: ICD-10-CM

## 2020-12-11 DIAGNOSIS — N48.1 BALANITIS: ICD-10-CM

## 2020-12-11 DIAGNOSIS — Z23 NEED FOR VACCINATION: ICD-10-CM

## 2020-12-11 DIAGNOSIS — R73.03 PREDIABETES: ICD-10-CM

## 2020-12-11 DIAGNOSIS — E55.9 VITAMIN D DEFICIENCY: ICD-10-CM

## 2020-12-11 DIAGNOSIS — Z00.00 ROUTINE PHYSICAL EXAMINATION: Primary | ICD-10-CM

## 2020-12-11 PROBLEM — R07.9 ACUTE CHEST PAIN: Status: RESOLVED | Noted: 2020-03-11 | Resolved: 2020-12-11

## 2020-12-11 PROCEDURE — 99396 PREV VISIT EST AGE 40-64: CPT | Performed by: INTERNAL MEDICINE

## 2020-12-11 PROCEDURE — 90750 HZV VACC RECOMBINANT IM: CPT | Performed by: INTERNAL MEDICINE

## 2020-12-11 PROCEDURE — 3008F BODY MASS INDEX DOCD: CPT | Performed by: INTERNAL MEDICINE

## 2020-12-11 PROCEDURE — 3078F DIAST BP <80 MM HG: CPT | Performed by: INTERNAL MEDICINE

## 2020-12-11 PROCEDURE — 3074F SYST BP LT 130 MM HG: CPT | Performed by: INTERNAL MEDICINE

## 2020-12-11 PROCEDURE — 90471 IMMUNIZATION ADMIN: CPT | Performed by: INTERNAL MEDICINE

## 2020-12-11 RX ORDER — FLUCONAZOLE 100 MG/1
100 TABLET ORAL DAILY
Qty: 5 TABLET | Refills: 0 | Status: SHIPPED | OUTPATIENT
Start: 2020-12-11 | End: 2020-12-16

## 2020-12-13 ENCOUNTER — TELEPHONE (OUTPATIENT)
Dept: INTERNAL MEDICINE CLINIC | Facility: CLINIC | Age: 65
End: 2020-12-13

## 2020-12-21 ENCOUNTER — TELEPHONE (OUTPATIENT)
Dept: GASTROENTEROLOGY | Facility: CLINIC | Age: 65
End: 2020-12-21

## 2020-12-21 NOTE — TELEPHONE ENCOUNTER
----- Message from Jairo Cid, [address] sent at 2/23/2018 11:40 AM CST -----  Regarding: Recall Colon  Recall colon for 3 years per SDK.  Colon done 2/20/18

## 2021-02-11 DIAGNOSIS — Z23 NEED FOR VACCINATION: ICD-10-CM

## 2021-02-14 ENCOUNTER — IMMUNIZATION (OUTPATIENT)
Dept: LAB | Age: 66
End: 2021-02-14
Attending: HOSPITALIST
Payer: COMMERCIAL

## 2021-02-14 DIAGNOSIS — Z23 NEED FOR VACCINATION: Primary | ICD-10-CM

## 2021-02-14 PROCEDURE — 0001A SARSCOV2 VAC 30MCG/0.3ML IM: CPT

## 2021-03-07 ENCOUNTER — IMMUNIZATION (OUTPATIENT)
Dept: LAB | Age: 66
End: 2021-03-07
Attending: HOSPITALIST
Payer: COMMERCIAL

## 2021-03-07 DIAGNOSIS — Z23 NEED FOR VACCINATION: Primary | ICD-10-CM

## 2021-03-07 PROCEDURE — 0002A SARSCOV2 VAC 30MCG/0.3ML IM: CPT

## 2021-03-11 RX ORDER — AMLODIPINE BESYLATE 5 MG/1
TABLET ORAL
Qty: 90 TABLET | Refills: 3 | Status: SHIPPED | OUTPATIENT
Start: 2021-03-11

## 2021-03-19 ENCOUNTER — TELEPHONE (OUTPATIENT)
Dept: GASTROENTEROLOGY | Facility: CLINIC | Age: 66
End: 2021-03-19

## 2021-03-19 ENCOUNTER — OFFICE VISIT (OUTPATIENT)
Dept: GASTROENTEROLOGY | Facility: CLINIC | Age: 66
End: 2021-03-19
Payer: COMMERCIAL

## 2021-03-19 VITALS
BODY MASS INDEX: 46.65 KG/M2 | HEIGHT: 69 IN | DIASTOLIC BLOOD PRESSURE: 84 MMHG | HEART RATE: 96 BPM | WEIGHT: 315 LBS | SYSTOLIC BLOOD PRESSURE: 144 MMHG

## 2021-03-19 DIAGNOSIS — Z12.11 SCREEN FOR COLON CANCER: Primary | ICD-10-CM

## 2021-03-19 PROCEDURE — S0285 CNSLT BEFORE SCREEN COLONOSC: HCPCS | Performed by: INTERNAL MEDICINE

## 2021-03-19 PROCEDURE — 3079F DIAST BP 80-89 MM HG: CPT | Performed by: INTERNAL MEDICINE

## 2021-03-19 PROCEDURE — 3077F SYST BP >= 140 MM HG: CPT | Performed by: INTERNAL MEDICINE

## 2021-03-19 PROCEDURE — 3008F BODY MASS INDEX DOCD: CPT | Performed by: INTERNAL MEDICINE

## 2021-03-19 RX ORDER — SODIUM, POTASSIUM,MAG SULFATES 17.5-3.13G
SOLUTION, RECONSTITUTED, ORAL ORAL
Qty: 1 BOTTLE | Refills: 0 | Status: SHIPPED | OUTPATIENT
Start: 2021-03-19 | End: 2021-12-17 | Stop reason: ALTCHOICE

## 2021-03-19 NOTE — H&P
[address] [facility] [address] Gastroenterology                                                                                                               Reason for consult: h history on file.    Social History: Social History    Tobacco Use      Smoking status: Never Smoker      Smokeless tobacco: Never Used    Vaping Use      Vaping Use: Never used    Alcohol use: Yes      Comment: rarely    Drug use: No       Medications (Acti normal    Labs/Imaging:     Patient's pertinent labs and imaging were reviewed and discussed with patient today.       .  ASSESSMENT/PLAN:   Seamus Russo is a 63year old year-old male with history of obesity, colon polyps, who presents for evaluat sign an informed consent and elected to proceed with colonoscopy with possible intervention [i.e. polypectomy, stent placement, etc.] as indicated. Orders This Visit:  No orders of the defined types were placed in this encounter.       Meds This Visi

## 2021-03-19 NOTE — PATIENT INSTRUCTIONS
1. Schedule colonoscopy with MAC [Diagnosis: screening] - schedule for 60 min    2.   bowel prep from pharmacy (split suprep)  --IF constipated, Buy over the counter dulcolax laxative, and take one tablet daily for 2-3 days prior to drinking the brii

## 2021-03-19 NOTE — TELEPHONE ENCOUNTER
Scheduled for:  Colonoscopy 42243  Provider Name:  Dr. Cristina Stanley  Date:  6/2/2021  Location:  [facility]  Sedation:  MAC  Time:  10:00 am, arrival 9:00 am  Prep:  Suprep  Meds/Allergies Reconciled?:  Physician reviewed  Diagnosis with codes:  Screening for colon canc

## 2021-03-26 ENCOUNTER — NURSE ONLY (OUTPATIENT)
Dept: INTERNAL MEDICINE CLINIC | Facility: CLINIC | Age: 66
End: 2021-03-26
Payer: COMMERCIAL

## 2021-03-26 DIAGNOSIS — Z23 NEED FOR VACCINATION: Primary | ICD-10-CM

## 2021-03-26 PROCEDURE — 90471 IMMUNIZATION ADMIN: CPT | Performed by: INTERNAL MEDICINE

## 2021-03-26 PROCEDURE — 90750 HZV VACC RECOMBINANT IM: CPT | Performed by: INTERNAL MEDICINE

## 2021-03-26 NOTE — PROGRESS NOTES
PtElena Rolle Si in for his 2nd Shingrix inj. Name,  and order was verified. Given in left deltoid and tolerated well.

## 2021-05-20 RX ORDER — LOSARTAN POTASSIUM AND HYDROCHLOROTHIAZIDE 25; 100 MG/1; MG/1
TABLET ORAL
Qty: 90 TABLET | Refills: 1 | Status: SHIPPED | OUTPATIENT
Start: 2021-05-20 | End: 2021-11-16

## 2021-05-30 ENCOUNTER — LAB ENCOUNTER (OUTPATIENT)
Dept: LAB | Facility: HOSPITAL | Age: 66
End: 2021-05-30
Attending: INTERNAL MEDICINE
Payer: COMMERCIAL

## 2021-05-30 DIAGNOSIS — Z01.818 PRE-OP TESTING: ICD-10-CM

## 2021-06-02 ENCOUNTER — ANESTHESIA EVENT (OUTPATIENT)
Dept: ENDOSCOPY | Facility: HOSPITAL | Age: 66
End: 2021-06-02
Payer: COMMERCIAL

## 2021-06-02 ENCOUNTER — HOSPITAL ENCOUNTER (OUTPATIENT)
Facility: HOSPITAL | Age: 66
Setting detail: HOSPITAL OUTPATIENT SURGERY
Discharge: HOME | End: 2021-06-02
Attending: INTERNAL MEDICINE | Admitting: INTERNAL MEDICINE
Payer: COMMERCIAL

## 2021-06-02 ENCOUNTER — ANESTHESIA (OUTPATIENT)
Dept: ENDOSCOPY | Facility: HOSPITAL | Age: 66
End: 2021-06-02
Payer: COMMERCIAL

## 2021-06-02 VITALS
SYSTOLIC BLOOD PRESSURE: 119 MMHG | HEIGHT: 69 IN | OXYGEN SATURATION: 97 % | DIASTOLIC BLOOD PRESSURE: 73 MMHG | TEMPERATURE: 97 F | WEIGHT: 315 LBS | BODY MASS INDEX: 46.65 KG/M2 | HEART RATE: 74 BPM | RESPIRATION RATE: 20 BRPM

## 2021-06-02 DIAGNOSIS — K63.5 SIGMOID POLYP: Primary | ICD-10-CM

## 2021-06-02 DIAGNOSIS — K64.9 HEMORRHOIDS: ICD-10-CM

## 2021-06-02 DIAGNOSIS — Z01.818 PRE-OP TESTING: ICD-10-CM

## 2021-06-02 DIAGNOSIS — Z12.11 SCREEN FOR COLON CANCER: ICD-10-CM

## 2021-06-02 PROCEDURE — 0DBN8ZX EXCISION OF SIGMOID COLON, VIA NATURAL OR ARTIFICIAL OPENING ENDOSCOPIC, DIAGNOSTIC: ICD-10-PCS | Performed by: INTERNAL MEDICINE

## 2021-06-02 PROCEDURE — 45385 COLONOSCOPY W/LESION REMOVAL: CPT | Performed by: INTERNAL MEDICINE

## 2021-06-02 RX ORDER — NALOXONE HYDROCHLORIDE 0.4 MG/ML
80 INJECTION, SOLUTION INTRAMUSCULAR; INTRAVENOUS; SUBCUTANEOUS AS NEEDED
Status: DISCONTINUED | OUTPATIENT
Start: 2021-06-02 | End: 2021-06-02

## 2021-06-02 RX ORDER — SODIUM CHLORIDE, SODIUM LACTATE, POTASSIUM CHLORIDE, CALCIUM CHLORIDE 600; 310; 30; 20 MG/100ML; MG/100ML; MG/100ML; MG/100ML
INJECTION, SOLUTION INTRAVENOUS CONTINUOUS
Status: DISCONTINUED | OUTPATIENT
Start: 2021-06-02 | End: 2021-06-02

## 2021-06-02 RX ORDER — LIDOCAINE HYDROCHLORIDE 10 MG/ML
INJECTION, SOLUTION EPIDURAL; INFILTRATION; INTRACAUDAL; PERINEURAL AS NEEDED
Status: DISCONTINUED | OUTPATIENT
Start: 2021-06-02 | End: 2021-06-02 | Stop reason: SURG

## 2021-06-02 RX ADMIN — SODIUM CHLORIDE, SODIUM LACTATE, POTASSIUM CHLORIDE, CALCIUM CHLORIDE: 600; 310; 30; 20 INJECTION, SOLUTION INTRAVENOUS at 09:54:00

## 2021-06-02 RX ADMIN — SODIUM CHLORIDE, SODIUM LACTATE, POTASSIUM CHLORIDE, CALCIUM CHLORIDE: 600; 310; 30; 20 INJECTION, SOLUTION INTRAVENOUS at 10:22:00

## 2021-06-02 RX ADMIN — LIDOCAINE HYDROCHLORIDE 50 MG: 10 INJECTION, SOLUTION EPIDURAL; INFILTRATION; INTRACAUDAL; PERINEURAL at 09:55:00

## 2021-06-02 NOTE — ANESTHESIA POSTPROCEDURE EVALUATION
Patient: John Phillips    Procedure Summary     Date: 06/02/21 Room / Location: [address] [place] [address] ENDOSCOPY 01 / [address] [place] [address] ENDOSCOPY    Anesthesia Start: 9560 Anesthesia Stop:     Procedure: COLONOSCOPY (N/A ) Diagnosis:       Screen for colon cancer      (Colon polyp

## 2021-06-02 NOTE — H&P
History & Physical Examination    Patient Name: Juliet Chino  MRN: [id number]  CSN: [id number]  YOB: 1955    Diagnosis: screening for colon cancer    LOSARTAN POTASSIUM-HCTZ 100-25 MG Oral Tab, TAKE 1 TABLET DAILY, Disp: 90 tablet, R Gastroenterology  6/2/2021  10:25 AM

## 2021-06-02 NOTE — OR NURSING
After removing the b/p cuff from patient's left arm the antecubital area was noted to e very red with small blisters noted. Advised patient to keep an eye on it today to make sure it did not get worse. Patient  Voiced understanding.

## 2021-06-02 NOTE — OPERATIVE REPORT
COLONOSCOPY REPORT    Delilah Payne     1955 Age 63year old   PCP Charu Flores MD Endoscopist Leanne Medina MD     Date of procedure: 21    Procedure: Colonoscopy w/cold snare polypectomy    Pre-operative diagnosis: Screen internal hemorrhoids. 5. The colonic mucosa throughout the colon showed normal vascular pattern, without evidence of angioectasias or inflammation. 6. GABE: normal rectal tone, no masses palpated. Impression:   · One small colon polyp removed.  Sm

## 2021-06-02 NOTE — ANESTHESIA PREPROCEDURE EVALUATION
Anesthesia PreOp Note    HPI:     Sintia Gonzalez is a 63year old male who presents for preoperative consultation requested by: Emani Santillan MD    Date of Surgery: 6/2/2021    Procedure(s):  COLONOSCOPY  Indication: Screen for colon cancer    Re Years of education: Not on file      Highest education level: Not on file    Occupational History      Not on file    Tobacco Use      Smoking status: Never Smoker      Smokeless tobacco: Never Used    Vaping Use      Vaping Use: Never used    Substance exam    breath sounds clear to auscultation  Cardiovascular - normal exam  (+) hypertension,     ECG reviewed  Rhythm: regular  Rate: normal  ROS comment: Currently denies CP or SOB  CP 3/2020 with negative workup.    Stress test: Normal regadenoson (Lexisc

## 2021-06-12 ENCOUNTER — TELEPHONE (OUTPATIENT)
Dept: GASTROENTEROLOGY | Facility: CLINIC | Age: 66
End: 2021-06-12

## 2021-06-12 NOTE — TELEPHONE ENCOUNTER
I mailed out colonoscopy results letter to pt  Updated health maintenance  Entered into  5 yr CLN recall  Recall colon in 5 years per.  Colon done 6/02/2021    Carisa Mann MD  [facility] Clinical Staff  GI staff: please place recall for colonoscopy in 5 y

## 2021-07-20 ENCOUNTER — TELEPHONE (OUTPATIENT)
Dept: INTERNAL MEDICINE CLINIC | Facility: CLINIC | Age: 66
End: 2021-07-20

## 2021-07-20 NOTE — TELEPHONE ENCOUNTER
Patient contacted office and states that he sees Dr Charisma Moon and will contact the office to have them fax progress notes to Doctor Des Leigh.

## 2021-07-20 NOTE — TELEPHONE ENCOUNTER
Patient due for annual diabetic eye exam, referral pending in EMR JSK please advise. Left message for patient to call office back, please transfer call to ext. [phone] when he calls office back.

## 2021-11-16 RX ORDER — LOSARTAN POTASSIUM AND HYDROCHLOROTHIAZIDE 25; 100 MG/1; MG/1
1 TABLET ORAL DAILY
Qty: 90 TABLET | Refills: 1 | Status: SHIPPED | OUTPATIENT
Start: 2021-11-16

## 2021-11-16 NOTE — TELEPHONE ENCOUNTER
Refilled per [address] [facility] protocol.   Requested Prescriptions   Pending Prescriptions Disp Refills    LOSARTAN-HYDROCHLOROTHIAZIDE 100-25 MG Oral Tab [Pharmacy Med Name: LOSARTAN/ HYDROCHLOROTHIAZIDE TABS 100/25MG] 90 tablet 3     Sig: TAKE 1 TABLET REDD

## 2021-12-17 ENCOUNTER — OFFICE VISIT (OUTPATIENT)
Dept: INTERNAL MEDICINE CLINIC | Facility: CLINIC | Age: 66
End: 2021-12-17
Payer: COMMERCIAL

## 2021-12-17 VITALS
SYSTOLIC BLOOD PRESSURE: 136 MMHG | HEART RATE: 92 BPM | TEMPERATURE: 98 F | BODY MASS INDEX: 46.65 KG/M2 | WEIGHT: 315 LBS | DIASTOLIC BLOOD PRESSURE: 78 MMHG | RESPIRATION RATE: 20 BRPM | HEIGHT: 69 IN

## 2021-12-17 DIAGNOSIS — R73.03 PREDIABETES: ICD-10-CM

## 2021-12-17 DIAGNOSIS — Z00.00 ROUTINE PHYSICAL EXAMINATION: Primary | ICD-10-CM

## 2021-12-17 DIAGNOSIS — I10 ESSENTIAL HYPERTENSION: ICD-10-CM

## 2021-12-17 PROCEDURE — 3075F SYST BP GE 130 - 139MM HG: CPT | Performed by: INTERNAL MEDICINE

## 2021-12-17 PROCEDURE — 3078F DIAST BP <80 MM HG: CPT | Performed by: INTERNAL MEDICINE

## 2021-12-17 PROCEDURE — 99396 PREV VISIT EST AGE 40-64: CPT | Performed by: INTERNAL MEDICINE

## 2021-12-17 PROCEDURE — 3008F BODY MASS INDEX DOCD: CPT | Performed by: INTERNAL MEDICINE

## 2021-12-17 NOTE — PROGRESS NOTES
HPI:    Patient ID: Clayton Hunter is a 61year old male. HPI  Patient is here requesting physical exam. Last seen here 1 year ago. At that time no changes made. Blood work showed evidence of prediabetes. Advised weight loss at the time.  Since th BESYLATE 5 MG Oral Tab TAKE 1 TABLET DAILY 90 tablet 3     Allergies:  Bee Pollen              SWELLING     PHYSICAL EXAM:   /78   Pulse 92   Temp 98 °F (36.7 °C) (Other)   Resp 20   Ht 5' 9\" (1.753 m)   Wt (!) 323 lb (146.5 kg)   BMI 47.70 kg/m² lb 6.4 oz (145.8 kg)  03/16/20 : (!) 321 lb (145.6 kg)  03/12/20 : (!) 325 lb 11.2 oz (147.7 kg)            ASSESSMENT/PLAN:   1. Routine physical examination  Physical exam remarkable for obesity. Active issues as below.   Health maintenance issues review

## 2021-12-21 ENCOUNTER — LAB ENCOUNTER (OUTPATIENT)
Dept: LAB | Age: 66
End: 2021-12-21
Attending: INTERNAL MEDICINE
Payer: COMMERCIAL

## 2021-12-21 DIAGNOSIS — Z00.00 ROUTINE PHYSICAL EXAMINATION: ICD-10-CM

## 2021-12-21 PROCEDURE — 83036 HEMOGLOBIN GLYCOSYLATED A1C: CPT

## 2021-12-21 PROCEDURE — 36415 COLL VENOUS BLD VENIPUNCTURE: CPT

## 2021-12-21 PROCEDURE — 80061 LIPID PANEL: CPT

## 2021-12-21 PROCEDURE — 84439 ASSAY OF FREE THYROXINE: CPT

## 2021-12-21 PROCEDURE — 84443 ASSAY THYROID STIM HORMONE: CPT

## 2021-12-21 PROCEDURE — 82306 VITAMIN D 25 HYDROXY: CPT

## 2021-12-21 PROCEDURE — 80053 COMPREHEN METABOLIC PANEL: CPT

## 2021-12-21 PROCEDURE — 84481 FREE ASSAY (FT-3): CPT

## 2021-12-21 PROCEDURE — 85025 COMPLETE CBC W/AUTO DIFF WBC: CPT

## 2022-01-03 ENCOUNTER — TELEPHONE (OUTPATIENT)
Dept: ENDOCRINOLOGY | Facility: HOSPITAL | Age: 67
End: 2022-01-03

## 2022-01-03 DIAGNOSIS — E11.9 DIABETES MELLITUS, NEW ONSET (HCC): Primary | ICD-10-CM

## 2022-01-03 NOTE — TELEPHONE ENCOUNTER
Paty Lim,  Your patient Naz Hedrick ([birth year]) has been scheduled for Diabetes education on 1/7. His current order states diagnosis of hyperglycemia however lab values indicates he considered  diagnosis of Diabetes with A1c > 6.5%.   Clarification o

## 2022-01-07 ENCOUNTER — HOSPITAL ENCOUNTER (OUTPATIENT)
Dept: ENDOCRINOLOGY | Facility: HOSPITAL | Age: 67
Discharge: HOME | End: 2022-01-07
Attending: NURSE PRACTITIONER
Payer: COMMERCIAL

## 2022-01-07 VITALS — WEIGHT: 315 LBS | BODY MASS INDEX: 48 KG/M2

## 2022-01-07 DIAGNOSIS — E11.9 DIABETES MELLITUS, NEW ONSET (HCC): Primary | ICD-10-CM

## 2022-01-07 RX ORDER — BLOOD SUGAR DIAGNOSTIC
1 STRIP MISCELLANEOUS 2 TIMES DAILY
Qty: 200 STRIP | Refills: 3 | Status: SHIPPED | OUTPATIENT
Start: 2022-01-07 | End: 2022-05-07

## 2022-01-07 RX ORDER — BLOOD-GLUCOSE METER
1 EACH MISCELLANEOUS 2 TIMES DAILY
Qty: 1 KIT | Refills: 0 | Status: SHIPPED | OUTPATIENT
Start: 2022-01-07

## 2022-01-07 RX ORDER — LANCETS 30 GAUGE
1 EACH MISCELLANEOUS 2 TIMES DAILY
Qty: 200 EACH | Refills: 3 | Status: SHIPPED | OUTPATIENT
Start: 2022-01-07 | End: 2022-05-07

## 2022-01-07 NOTE — PROGRESS NOTES
Kelli Castanon : 1955  attended individual initial assessment for Diabetes Education:    Date: 2022         Start time: 7:50 am End time: 8:40 am    HgbA1C (%)   Date Value   2021 8.3 (H)       Wt Readings from Last 1 Encounters: acute complications: taught symptoms of hypoglycemia, hyperglycemia, how to treat low blood sugar (Rule of 15) and actions for lowering high blood glucose levels.      Behavior Change:  Initiated individualized goal setting process and will continue to [facility]

## 2022-02-10 ENCOUNTER — HOSPITAL ENCOUNTER (OUTPATIENT)
Dept: ENDOCRINOLOGY | Facility: HOSPITAL | Age: 67
Discharge: HOME | End: 2022-02-10
Attending: NURSE PRACTITIONER
Payer: COMMERCIAL

## 2022-02-10 VITALS — BODY MASS INDEX: 47 KG/M2 | WEIGHT: 315 LBS

## 2022-02-10 DIAGNOSIS — E11.9 DIABETES MELLITUS, NEW ONSET (HCC): Primary | ICD-10-CM

## 2022-02-10 NOTE — PROGRESS NOTES
Le Richards  : 1955  attended Step 2 Group Diabetes Education Class:     Date: 2/10/2022 Start time: 1:00 pm End time: 2:30 pm    Wt Readings from Last 1 Encounters:  02/10/22 : (!) 318 lb 8 oz   Weight change since start of program: -5.8 lbs    Diabetes Overview:  Pathophysiology, pre-diabetes, A1C results, treatment options for diabetes self-management, types of diabetes, myths and facts, risk factors, benefits of good blood glucose control and psychosocial aspects reviewed. Assessment: Tara Chaudhari is monitoring his blood sugar and keep food records by using a phone jorge. Patient demonstrates self-monitoring and dietary tracking. Healthy Eating:  Reviewed Balanced Plate Method and discussed examples of Balanced Plate Method meal planning. Reviewed macronutrients (carbohydrate, protein, fat) and their impact on blood glucose levels. Introduced benefits of lower fat food choices. Being Active:  Discussed exercise benefits and precautions including its effect on blood glucose levels. Monitoring:  Benefits and options for glucose monitoring, target BG goals, HgbA1C values. Emphasized importance and benefit of dietary tracking. Problem Solving: Prevention, detection and treatment of acute complications:  Discussed signs, symptoms and treatment of hypoglycemia and hyperglycemia. Medication:  Instructed on classes of Diabetes medications available and additional cardiovascular and renal benefits. Behavior Change:  Goals set for nutrition and monitoring of blood glucose. Discussed how habits are formed, identifying cues and triggers. Discussed identifying barriers to action. Discussed how dietary tracking benefits weight loss. Reviewed and updated individual goals and action plan set by patient. Recommendations:  Implement healthy eating habits with portion control and following Balanced Plate Method. Monitor blood glucose as directed.   Attend remaining sessions. Continue to implement individual goals. Written materials provided for all areas covered. Patient verbalized understanding and has no further questions currently.     Joanna Crum RN

## 2022-02-14 RX ORDER — AMLODIPINE BESYLATE 5 MG/1
5 TABLET ORAL DAILY
Qty: 90 TABLET | Refills: 1 | Status: SHIPPED | OUTPATIENT
Start: 2022-02-14 | End: 2022-08-15

## 2022-02-14 NOTE — TELEPHONE ENCOUNTER
Refill passed per [place] [facility] protocol.     Requested Prescriptions   Pending Prescriptions Disp Refills    AMLODIPINE 5 MG Oral Tab [Pharmacy Med Name: AMLODIPINE BESYLATE TABS 5MG] 90 tablet 3     Sig: TAKE 1 TABLET DAILY        Hypertensive Medications Protocol Passed - 2/14/2022 12:29 AM        Passed - CMP or BMP in past 12 months        Passed - Appointment in past 6 or next 3 months        Passed - GFR Non- > 50     Lab Results   Component Value Date    GFRNAA 81 12/21/2021                       Recent Outpatient Visits              1 month ago Routine physical examination    Jose Son MD    Office Visit    10 months ago Need for vaccination    [facility], [address], [place]    Nurse Only    11 months ago Screen for colon cancer    [facility], [address] [facility], Kayla Soto MD    Office Visit    1 year ago Routine physical examination    [facility] [address], Delia Schmitt MD    Office Visit    1 year ago Chest pain, unspecified type    [facility] [address], Delia Schmitt MD    Office Visit            Future Appointments         Provider Department Appt Notes    In 3 days Bang 4     In 1 week Bang 4     In 1 month Bang 4

## 2022-02-17 ENCOUNTER — HOSPITAL ENCOUNTER (OUTPATIENT)
Dept: ENDOCRINOLOGY | Facility: HOSPITAL | Age: 67
Discharge: HOME | End: 2022-02-17
Attending: NURSE PRACTITIONER
Payer: COMMERCIAL

## 2022-02-17 VITALS — BODY MASS INDEX: 47 KG/M2 | WEIGHT: 315 LBS

## 2022-02-17 DIAGNOSIS — E11.9 DIABETES MELLITUS, NEW ONSET (HCC): Primary | ICD-10-CM

## 2022-02-17 NOTE — PROGRESS NOTES
Brandon Crum [facility] : 1955 attended Step 3 Group Diabetes Education Class:    2022   Start time: 1300  End time: 1440    Wt Readings from Last 1 Encounters:  22 : (!) 316 lb          Weight change since start of program: 2.5# down    Blood Glucose: Controlled: Stable but a few FBG are slightly over 130       Healthy Eating:  Reviewed Basic Diet Guidelines as foundation of diabetes meal planning. Reinforced the balanced plate method. Taught nutrition basics defining carbohydrate, protein, and fat. Taught label reading, including an option to count carbohydrate grams or servings. Provided patient with goals for carbohydrate grams/choices for meals and snacks. Discussed sugar substitutes, ETOH and effect on blood glucose. Jeremiah's helpful for smart phones, as well as websites for examining carbohydrate levels provided. Reviewed and reinforced macronutrient's, carbohydrate counting and meal planning. Problem Solving:  Reinforced signs, symptoms, and treatment of hypoglycemia using the Rule of 15. Discussed impact of different food items and portion sizes on blood glucose levels. Discussed blood glucose target of 180 mg/dL, if testing 2 hours post meal.    Monitoring:  Reviewed blood glucose records and importance of tracking foods/blood glucose levels. Reinforced the importance of taking medications as prescribed. Behavior Change:  Reviewed and updated individual goals and action plan set by patient. Addressed barriers to tracking foods/blood glucose levels and following guidelines presented/achieving goals, as a group discussion. Recommendations: Follow individual meal plan recommended by Educator ([address]). Continue implementing individual goals. Attend remaining sessions. Begin implementing carbohydrate counting and continue dietary and blood glucose tracking. Written materials provided for all areas covered.     Patient verbalized understanding and has no further questions currently.       Juni Mendoza RD

## 2022-02-24 ENCOUNTER — HOSPITAL ENCOUNTER (OUTPATIENT)
Dept: ENDOCRINOLOGY | Facility: HOSPITAL | Age: 67
Discharge: HOME | End: 2022-02-24
Attending: NURSE PRACTITIONER
Payer: COMMERCIAL

## 2022-02-24 VITALS — WEIGHT: 311 LBS | BODY MASS INDEX: 46 KG/M2

## 2022-02-24 DIAGNOSIS — E11.9 DIABETES MELLITUS, NEW ONSET (HCC): Primary | ICD-10-CM

## 2022-02-24 NOTE — PROGRESS NOTES
[facility]  : 1955 attended Step 4 Group Diabetes Education Class:     2022  Start time: 1:00 pm End time: 2:30 pm    Wt Readings from Last 1 Encounters:  22 : (!) 311 lb     Weight change since start of program:  -13.4 lbs    Blood Glucose: Controlled: Stable    Reviewed information covered in previous classes including benefits of maintaining good blood glucose control and healthy weight in decreasing diabetes complications. Prevention, detection and treatment of chronic complications:  Reviewed how to reduce risks of complications including eye, foot, dental, renal and cardiovascular. Discussed American Diabetes Association guidelines for testing including eye exam, lipid panels, urine protein tests, dental and foot exams. Discussed risk factors associated with smoking and the importance of smoking cessation. Encouraged to get annual flu shot. Discussed importance of immunizations, vaccinations and guidelines for sick day management. Discussed wearing medical ID. and traveling    Problem Solving:  Discussed signs, symptoms, and prevention of HHNS. Discussed disaster preparedness and traveling with Diabetes. Discussed Diabetes medication assistance and supply management. Healthy Eating:  Reviewed and reinforced macronutrients, carbohydrate counting, and meal planning. Reviewed meal planning methods. Discussed healthy eating approaches for dining out, holidays and special occasions. Provided tips on how family members can support healthy changes in diet. Recommendations:  Monitor blood glucose as directed. Follow individual meal plan and continue dietary tracking. Attend remaining sessions. Continue to implement individual goals. Written materials provided for all areas covered. Patient verbalized understanding and has no further questions.     Lorna Esqueda RN

## 2022-03-31 ENCOUNTER — APPOINTMENT (OUTPATIENT)
Dept: ENDOCRINOLOGY | Facility: HOSPITAL | Age: 67
End: 2022-03-31
Attending: NURSE PRACTITIONER
Payer: COMMERCIAL

## 2022-05-16 RX ORDER — LOSARTAN POTASSIUM AND HYDROCHLOROTHIAZIDE 25; 100 MG/1; MG/1
1 TABLET ORAL DAILY
Qty: 90 TABLET | Refills: 1 | Status: SHIPPED | OUTPATIENT
Start: 2022-05-16 | End: 2022-11-12

## 2022-05-16 NOTE — TELEPHONE ENCOUNTER
Refill passed per [place] [facility] protocol.      Requested Prescriptions   Pending Prescriptions Disp Refills    LOSARTAN-HYDROCHLOROTHIAZIDE 100-25 MG Oral Tab [Pharmacy Med Name: LOSARTAN/ HYDROCHLOROTHIAZIDE TABS 100/25MG] 90 tablet 3     Sig: TAKE 1 TABLET DAILY        Hypertensive Medications Protocol Passed - 5/16/2022 12:23 AM        Passed - CMP or BMP in past 12 months        Passed - Appointment in past 6 or next 3 months        Passed - GFR Non- > 50     Lab Results   Component Value Date    GFRNAA 81 12/21/2021                       [unfilled]      [unfilled]

## 2022-08-15 RX ORDER — AMLODIPINE BESYLATE 5 MG/1
TABLET ORAL
Qty: 90 TABLET | Refills: 3 | Status: SHIPPED | OUTPATIENT
Start: 2022-08-15

## 2022-11-12 RX ORDER — LOSARTAN POTASSIUM AND HYDROCHLOROTHIAZIDE 25; 100 MG/1; MG/1
TABLET ORAL
Qty: 90 TABLET | Refills: 1 | Status: SHIPPED | OUTPATIENT
Start: 2022-11-12

## 2022-12-16 ENCOUNTER — OFFICE VISIT (OUTPATIENT)
Dept: INTERNAL MEDICINE CLINIC | Facility: CLINIC | Age: 67
End: 2022-12-16
Payer: MEDICARE

## 2022-12-16 VITALS
TEMPERATURE: 98 F | WEIGHT: 305 LBS | RESPIRATION RATE: 20 BRPM | HEART RATE: 86 BPM | SYSTOLIC BLOOD PRESSURE: 130 MMHG | HEIGHT: 68.9 IN | BODY MASS INDEX: 45.18 KG/M2 | DIASTOLIC BLOOD PRESSURE: 76 MMHG

## 2022-12-16 DIAGNOSIS — Z00.00 ENCOUNTER FOR ANNUAL HEALTH EXAMINATION: Primary | ICD-10-CM

## 2022-12-16 DIAGNOSIS — N48.1 BALANITIS: ICD-10-CM

## 2022-12-16 DIAGNOSIS — I10 ESSENTIAL HYPERTENSION: ICD-10-CM

## 2022-12-16 DIAGNOSIS — E11.9 TYPE 2 DIABETES MELLITUS WITHOUT COMPLICATION, WITHOUT LONG-TERM CURRENT USE OF INSULIN (HCC): ICD-10-CM

## 2022-12-16 DIAGNOSIS — E66.01 CLASS 3 SEVERE OBESITY WITH SERIOUS COMORBIDITY AND BODY MASS INDEX (BMI) OF 45.0 TO 49.9 IN ADULT, UNSPECIFIED OBESITY TYPE (HCC): ICD-10-CM

## 2022-12-16 DIAGNOSIS — Z23 NEED FOR VACCINATION: ICD-10-CM

## 2022-12-16 DIAGNOSIS — Z12.5 SCREENING FOR PROSTATE CANCER: ICD-10-CM

## 2022-12-20 ENCOUNTER — LAB ENCOUNTER (OUTPATIENT)
Dept: LAB | Age: 67
End: 2022-12-20
Attending: INTERNAL MEDICINE
Payer: MEDICARE

## 2022-12-20 DIAGNOSIS — E11.9 TYPE 2 DIABETES MELLITUS WITHOUT COMPLICATION, WITHOUT LONG-TERM CURRENT USE OF INSULIN (HCC): ICD-10-CM

## 2022-12-20 DIAGNOSIS — Z12.5 SCREENING FOR PROSTATE CANCER: ICD-10-CM

## 2022-12-20 LAB
ALBUMIN SERPL-MCNC: 3.7 G/DL (ref 3.4–5)
ALBUMIN/GLOB SERPL: 1.2 {RATIO} (ref 1–2)
ALP LIVER SERPL-CCNC: 63 U/L
ALT SERPL-CCNC: 34 U/L
ANION GAP SERPL CALC-SCNC: 4 MMOL/L (ref 0–18)
AST SERPL-CCNC: 13 U/L (ref 15–37)
BASOPHILS # BLD AUTO: 0.05 X10(3) UL (ref 0–0.2)
BASOPHILS NFR BLD AUTO: 0.8 %
BILIRUB SERPL-MCNC: 0.7 MG/DL (ref 0.1–2)
BUN BLD-MCNC: 16 MG/DL (ref 7–18)
BUN/CREAT SERPL: 16.5 (ref 10–20)
CALCIUM BLD-MCNC: 9.2 MG/DL (ref 8.5–10.1)
CHLORIDE SERPL-SCNC: 108 MMOL/L (ref 98–112)
CHOLEST SERPL-MCNC: 143 MG/DL (ref ?–200)
CO2 SERPL-SCNC: 28 MMOL/L (ref 21–32)
COMPLEXED PSA SERPL-MCNC: 1.25 NG/ML (ref ?–4)
CREAT BLD-MCNC: 0.97 MG/DL
CREAT UR-SCNC: 228 MG/DL
DEPRECATED RDW RBC AUTO: 46.1 FL (ref 35.1–46.3)
EOSINOPHIL # BLD AUTO: 0.29 X10(3) UL (ref 0–0.7)
EOSINOPHIL NFR BLD AUTO: 4.4 %
ERYTHROCYTE [DISTWIDTH] IN BLOOD BY AUTOMATED COUNT: 12.8 % (ref 11–15)
EST. AVERAGE GLUCOSE BLD GHB EST-MCNC: 163 MG/DL (ref 68–126)
FASTING PATIENT LIPID ANSWER: YES
FASTING STATUS PATIENT QL REPORTED: YES
GFR SERPLBLD BASED ON 1.73 SQ M-ARVRAT: 87 ML/MIN/1.73M2 (ref 60–?)
GLOBULIN PLAS-MCNC: 3.2 G/DL (ref 2.8–4.4)
GLUCOSE BLD-MCNC: 131 MG/DL (ref 70–99)
HBA1C MFR BLD: 7.3 % (ref ?–5.7)
HCT VFR BLD AUTO: 45.2 %
HDLC SERPL-MCNC: 39 MG/DL (ref 40–59)
HGB BLD-MCNC: 14.8 G/DL
IMM GRANULOCYTES # BLD AUTO: 0.01 X10(3) UL (ref 0–1)
IMM GRANULOCYTES NFR BLD: 0.2 %
LDLC SERPL CALC-MCNC: 87 MG/DL (ref ?–100)
LYMPHOCYTES # BLD AUTO: 1.88 X10(3) UL (ref 1–4)
LYMPHOCYTES NFR BLD AUTO: 28.6 %
MCH RBC QN AUTO: 31.4 PG (ref 26–34)
MCHC RBC AUTO-ENTMCNC: 32.7 G/DL (ref 31–37)
MCV RBC AUTO: 95.8 FL
MICROALBUMIN UR-MCNC: 1.53 MG/DL
MICROALBUMIN/CREAT 24H UR-RTO: 6.7 UG/MG (ref ?–30)
MONOCYTES # BLD AUTO: 0.58 X10(3) UL (ref 0.1–1)
MONOCYTES NFR BLD AUTO: 8.8 %
NEUTROPHILS # BLD AUTO: 3.76 X10 (3) UL (ref 1.5–7.7)
NEUTROPHILS # BLD AUTO: 3.76 X10(3) UL (ref 1.5–7.7)
NEUTROPHILS NFR BLD AUTO: 57.2 %
NONHDLC SERPL-MCNC: 104 MG/DL (ref ?–130)
OSMOLALITY SERPL CALC.SUM OF ELEC: 293 MOSM/KG (ref 275–295)
PLATELET # BLD AUTO: 174 10(3)UL (ref 150–450)
POTASSIUM SERPL-SCNC: 3.7 MMOL/L (ref 3.5–5.1)
PROT SERPL-MCNC: 6.9 G/DL (ref 6.4–8.2)
RBC # BLD AUTO: 4.72 X10(6)UL
SODIUM SERPL-SCNC: 140 MMOL/L (ref 136–145)
T3FREE SERPL-MCNC: 2.64 PG/ML (ref 2.4–4.2)
T4 FREE SERPL-MCNC: 1.2 NG/DL (ref 0.8–1.7)
TRIGL SERPL-MCNC: 89 MG/DL (ref 30–149)
TSI SER-ACNC: 0.32 MIU/ML (ref 0.36–3.74)
VLDLC SERPL CALC-MCNC: 14 MG/DL (ref 0–30)
WBC # BLD AUTO: 6.6 X10(3) UL (ref 4–11)

## 2022-12-20 PROCEDURE — 84443 ASSAY THYROID STIM HORMONE: CPT

## 2022-12-20 PROCEDURE — 80061 LIPID PANEL: CPT

## 2022-12-20 PROCEDURE — 83036 HEMOGLOBIN GLYCOSYLATED A1C: CPT

## 2022-12-20 PROCEDURE — 84481 FREE ASSAY (FT-3): CPT

## 2022-12-20 PROCEDURE — 84439 ASSAY OF FREE THYROXINE: CPT

## 2022-12-20 PROCEDURE — 36415 COLL VENOUS BLD VENIPUNCTURE: CPT

## 2022-12-20 PROCEDURE — 85025 COMPLETE CBC W/AUTO DIFF WBC: CPT

## 2022-12-20 PROCEDURE — 80053 COMPREHEN METABOLIC PANEL: CPT

## 2022-12-20 PROCEDURE — 82043 UR ALBUMIN QUANTITATIVE: CPT

## 2022-12-20 PROCEDURE — 82570 ASSAY OF URINE CREATININE: CPT

## 2023-05-10 RX ORDER — LOSARTAN POTASSIUM AND HYDROCHLOROTHIAZIDE 25; 100 MG/1; MG/1
1 TABLET ORAL DAILY
Qty: 90 TABLET | Refills: 3 | Status: SHIPPED | OUTPATIENT
Start: 2023-05-10

## 2023-05-10 NOTE — TELEPHONE ENCOUNTER
Refill passed per [employer] protocol    Requested Prescriptions   Pending Prescriptions Disp Refills    LOSARTAN-HYDROCHLOROTHIAZIDE 100-25 MG Oral Tab [Pharmacy Med Name: LOSARTAN/ HYDROCHLOROTHIAZIDE TABS 100/25MG] 90 tablet 3     Sig: TAKE 1 TABLET DAILY       Hypertensive Medications Protocol Passed - 5/9/2023 11:36 PM        Passed - In person appointment in the past 12 or next 3 months     Recent Outpatient Visits              4 months ago Encounter for annual health examination    [address] [facility], Delia Schmitt MD    Office Visit    1 year ago Routine physical examination    [address] [facility], Delia Schmitt MD    Office Visit    2 years ago Need for vaccination    [address], [address], [place]    Nurse Only    2 years ago Screen for colon cancer    [address], [address] [facility], Ron Negrete MD    Office Visit    2 years ago Routine physical examination    [address] [facility]    Office Visit                      Passed - Last BP reading less than 140/90     BP Readings from Last 1 Encounters:  12/16/22 : 130/76                Passed - CMP or BMP in past 6 months     Recent Results (from the past 4392 hour(s))   COMP METABOLIC PANEL (14)    Collection Time: 12/20/22  8:38 AM   Result Value Ref Range    Glucose 131 (H) 70 - 99 mg/dL    Sodium 140 136 - 145 mmol/L    Potassium 3.7 3.5 - 5.1 mmol/L    Chloride 108 98 - 112 mmol/L    CO2 28.0 21.0 - 32.0 mmol/L    Anion Gap 4 0 - 18 mmol/L    BUN 16 7 - 18 mg/dL    Creatinine 0.97 0.70 - 1.30 mg/dL    BUN/CREA Ratio 16.5 10.0 - 20.0    Calcium, Total 9.2 8.5 - 10.1 mg/dL    Calculated Osmolality 293 275 - 295 mOsm/kg    eGFR-Cr 87 >=60 mL/min/1.73m2    ALT 34 16 - 61 U/L    AST 13 (L) 15 - 37 U/L    Alkaline Phosphatase 63 45 - 117 U/L    Bilirubin, Total 0.7 0.1 - 2.0 mg/dL Total Protein 6.9 6.4 - 8.2 g/dL    Albumin 3.7 3.4 - 5.0 g/dL    Globulin  3.2 2.8 - 4.4 g/dL    A/G Ratio 1.2 1.0 - 2.0    Patient Fasting for CMP? Yes      *Note: Due to a large number of results and/or encounters for the requested time period, some results have not been displayed. A complete set of results can be found in Results Review.                  Passed - In person appointment or virtual visit in the past 6 months     Recent Outpatient Visits              4 months ago Encounter for annual health examination    Lanette Osorio MD    Office Visit    1 year ago Routine physical examination    [facility] Matamoros, Sushant Morales MD    Office Visit    2 years ago Need for vaccination    [address], [address], [place]    Nurse Only    2 years ago Screen for colon cancer    Pasha Blue MD    Office Visit    2 years ago Routine physical examination    Lanette Matamoros MD    Office Visit                      [facility] or GFRNAA > 50     GFR Evaluation  EGFRCR: 87 , resulted on 12/20/2022

## 2023-06-30 ENCOUNTER — TELEPHONE (OUTPATIENT)
Dept: INTERNAL MEDICINE CLINIC | Facility: CLINIC | Age: 68
End: 2023-06-30

## 2023-08-09 NOTE — TELEPHONE ENCOUNTER
Please review; protocol failed. Requested Prescriptions   Pending Prescriptions Disp Refills    AMLODIPINE 5 MG Oral Tab [Pharmacy Med Name: AMLODIPINE BESYLATE TABS 5MG] 90 tablet 3     Sig: TAKE 1 TABLET DAILY       Hypertensive Medications Protocol Failed - 8/7/2023 11:38 PM        Failed - CMP or BMP in past 6 months     No results found for this or any previous visit (from the past 4392 hour(s)).             Failed - In person appointment or virtual visit in the past 6 months     Recent Outpatient Visits              7 months ago Encounter for annual health examination    Barb Pereira MD    Office Visit    1 year ago Routine physical examination    [address] Cosette Aho, MD    Office Visit    2 years ago Need for vaccination    [address], [address], [place]    Nurse Only    2 years ago Screen for colon cancer    [address], [address] [facility] Pasha turner MD    Office Visit    2 years ago Routine physical examination    [address] [facility] Barb [facility]    Office Visit          Future Appointments         Provider Department Appt Notes    In 4 months Jesenia Cabrera MD [address], [address] [facility] LAST SUPER VISIT 12/16/2022  Annual check up               Passed - In person appointment in the past 12 or next 3 months     Recent Outpatient Visits              7 months ago Encounter for annual health examination    Barb Pereira MD    Office Visit    1 year ago Routine physical examination    [address] Oskar Moura Cosette Aho, MD    Office Visit    2 years ago Need for vaccination    [address] [facility], [place]    Nurse Only    2 years ago Screen for colon cancer Pasha Otero MD    Office Visit    2 years ago Routine physical examination    [address] [facility], Katherine Hatch MD    Office Visit          Future Appointments         Provider Department Appt Notes    In 4 months Kristel Blair MD [address], [address] [facility] LAST SUPER VISIT 12/16/2022  Annual check up               Passed - Last BP reading less than 140/90     BP Readings from Last 1 Encounters:  12/16/22 : 130/76              Passed - EGFRCR or GFRNAA > 50     GFR Evaluation  EGFRCR: 87 , resulted on 12/20/2022

## 2023-08-10 RX ORDER — AMLODIPINE BESYLATE 5 MG/1
5 TABLET ORAL DAILY
Qty: 90 TABLET | Refills: 1 | Status: SHIPPED | OUTPATIENT
Start: 2023-08-10

## 2023-12-15 ENCOUNTER — OFFICE VISIT (OUTPATIENT)
Dept: INTERNAL MEDICINE CLINIC | Facility: CLINIC | Age: 68
End: 2023-12-15
Payer: MEDICARE

## 2023-12-15 VITALS
RESPIRATION RATE: 20 BRPM | WEIGHT: 309 LBS | HEART RATE: 96 BPM | SYSTOLIC BLOOD PRESSURE: 132 MMHG | HEIGHT: 68.9 IN | DIASTOLIC BLOOD PRESSURE: 80 MMHG | BODY MASS INDEX: 45.77 KG/M2 | TEMPERATURE: 97 F

## 2023-12-15 DIAGNOSIS — E66.01 CLASS 3 SEVERE OBESITY WITH SERIOUS COMORBIDITY AND BODY MASS INDEX (BMI) OF 45.0 TO 49.9 IN ADULT, UNSPECIFIED OBESITY TYPE (HCC): ICD-10-CM

## 2023-12-15 DIAGNOSIS — Z12.5 SCREENING FOR PROSTATE CANCER: ICD-10-CM

## 2023-12-15 DIAGNOSIS — E11.9 TYPE 2 DIABETES MELLITUS WITHOUT COMPLICATION, WITHOUT LONG-TERM CURRENT USE OF INSULIN (HCC): ICD-10-CM

## 2023-12-15 DIAGNOSIS — Z00.00 ENCOUNTER FOR ANNUAL HEALTH EXAMINATION: Primary | ICD-10-CM

## 2023-12-15 DIAGNOSIS — I10 ESSENTIAL HYPERTENSION: ICD-10-CM

## 2023-12-18 ENCOUNTER — LAB ENCOUNTER (OUTPATIENT)
Dept: LAB | Age: 68
End: 2023-12-18
Attending: INTERNAL MEDICINE
Payer: MEDICARE

## 2023-12-18 DIAGNOSIS — E11.9 TYPE 2 DIABETES MELLITUS WITHOUT COMPLICATION, WITHOUT LONG-TERM CURRENT USE OF INSULIN (HCC): ICD-10-CM

## 2023-12-18 DIAGNOSIS — Z12.5 SCREENING FOR PROSTATE CANCER: ICD-10-CM

## 2023-12-18 LAB
ALBUMIN SERPL-MCNC: 4.5 G/DL (ref 3.2–4.8)
ALBUMIN/GLOB SERPL: 1.5 {RATIO} (ref 1–2)
ALP LIVER SERPL-CCNC: 65 U/L
ALT SERPL-CCNC: 39 U/L
ANION GAP SERPL CALC-SCNC: 5 MMOL/L (ref 0–18)
AST SERPL-CCNC: 19 U/L (ref ?–34)
BASOPHILS # BLD AUTO: 0.07 X10(3) UL (ref 0–0.2)
BASOPHILS NFR BLD AUTO: 1.1 %
BILIRUB SERPL-MCNC: 0.9 MG/DL (ref 0.2–1.1)
BUN BLD-MCNC: 17 MG/DL (ref 9–23)
BUN/CREAT SERPL: 17.2 (ref 10–20)
CALCIUM BLD-MCNC: 9.6 MG/DL (ref 8.7–10.4)
CHLORIDE SERPL-SCNC: 103 MMOL/L (ref 98–112)
CHOLEST SERPL-MCNC: 150 MG/DL (ref ?–200)
CO2 SERPL-SCNC: 31 MMOL/L (ref 21–32)
COMPLEXED PSA SERPL-MCNC: 0.88 NG/ML (ref ?–4)
CREAT BLD-MCNC: 0.99 MG/DL
CREAT UR-SCNC: 97.5 MG/DL
DEPRECATED RDW RBC AUTO: 42.1 FL (ref 35.1–46.3)
EGFRCR SERPLBLD CKD-EPI 2021: 84 ML/MIN/1.73M2 (ref 60–?)
EOSINOPHIL # BLD AUTO: 0.38 X10(3) UL (ref 0–0.7)
EOSINOPHIL NFR BLD AUTO: 5.8 %
ERYTHROCYTE [DISTWIDTH] IN BLOOD BY AUTOMATED COUNT: 12.3 % (ref 11–15)
EST. AVERAGE GLUCOSE BLD GHB EST-MCNC: 243 MG/DL (ref 68–126)
FASTING PATIENT LIPID ANSWER: YES
FASTING STATUS PATIENT QL REPORTED: YES
GLOBULIN PLAS-MCNC: 3 G/DL (ref 2.8–4.4)
GLUCOSE BLD-MCNC: 197 MG/DL (ref 70–99)
HBA1C MFR BLD: 10.1 % (ref ?–5.7)
HCT VFR BLD AUTO: 47.7 %
HDLC SERPL-MCNC: 37 MG/DL (ref 40–59)
HGB BLD-MCNC: 15.5 G/DL
IMM GRANULOCYTES # BLD AUTO: 0.01 X10(3) UL (ref 0–1)
IMM GRANULOCYTES NFR BLD: 0.2 %
LDLC SERPL CALC-MCNC: 93 MG/DL (ref ?–100)
LYMPHOCYTES # BLD AUTO: 2.54 X10(3) UL (ref 1–4)
LYMPHOCYTES NFR BLD AUTO: 38.8 %
MCH RBC QN AUTO: 30 PG (ref 26–34)
MCHC RBC AUTO-ENTMCNC: 32.5 G/DL (ref 31–37)
MCV RBC AUTO: 92.4 FL
MICROALBUMIN UR-MCNC: 0.4 MG/DL
MICROALBUMIN/CREAT 24H UR-RTO: 4.1 UG/MG (ref ?–30)
MONOCYTES # BLD AUTO: 0.5 X10(3) UL (ref 0.1–1)
MONOCYTES NFR BLD AUTO: 7.6 %
NEUTROPHILS # BLD AUTO: 3.05 X10 (3) UL (ref 1.5–7.7)
NEUTROPHILS # BLD AUTO: 3.05 X10(3) UL (ref 1.5–7.7)
NEUTROPHILS NFR BLD AUTO: 46.5 %
NONHDLC SERPL-MCNC: 113 MG/DL (ref ?–130)
OSMOLALITY SERPL CALC.SUM OF ELEC: 295 MOSM/KG (ref 275–295)
PLATELET # BLD AUTO: 146 10(3)UL (ref 150–450)
POTASSIUM SERPL-SCNC: 4.4 MMOL/L (ref 3.5–5.1)
PROT SERPL-MCNC: 7.5 G/DL (ref 5.7–8.2)
RBC # BLD AUTO: 5.16 X10(6)UL
SODIUM SERPL-SCNC: 139 MMOL/L (ref 136–145)
T3FREE SERPL-MCNC: 4.28 PG/ML (ref 2.4–4.2)
T4 FREE SERPL-MCNC: 1.6 NG/DL (ref 0.8–1.7)
TRIGL SERPL-MCNC: 111 MG/DL (ref 30–149)
TSI SER-ACNC: 0.03 MIU/ML (ref 0.55–4.78)
VIT B12 SERPL-MCNC: 529 PG/ML (ref 211–911)
VLDLC SERPL CALC-MCNC: 18 MG/DL (ref 0–30)
WBC # BLD AUTO: 6.6 X10(3) UL (ref 4–11)

## 2023-12-18 PROCEDURE — 82043 UR ALBUMIN QUANTITATIVE: CPT

## 2023-12-18 PROCEDURE — 82607 VITAMIN B-12: CPT

## 2023-12-18 PROCEDURE — 83036 HEMOGLOBIN GLYCOSYLATED A1C: CPT

## 2023-12-18 PROCEDURE — 84481 FREE ASSAY (FT-3): CPT

## 2023-12-18 PROCEDURE — 36415 COLL VENOUS BLD VENIPUNCTURE: CPT

## 2023-12-18 PROCEDURE — 80053 COMPREHEN METABOLIC PANEL: CPT

## 2023-12-18 PROCEDURE — 84439 ASSAY OF FREE THYROXINE: CPT

## 2023-12-18 PROCEDURE — 84443 ASSAY THYROID STIM HORMONE: CPT

## 2023-12-18 PROCEDURE — 82570 ASSAY OF URINE CREATININE: CPT

## 2023-12-18 PROCEDURE — 85025 COMPLETE CBC W/AUTO DIFF WBC: CPT

## 2023-12-18 PROCEDURE — 80061 LIPID PANEL: CPT

## 2024-02-22 ENCOUNTER — OFFICE VISIT (OUTPATIENT)
Dept: ENDOCRINOLOGY CLINIC | Facility: CLINIC | Age: 69
End: 2024-02-22

## 2024-02-22 ENCOUNTER — LAB ENCOUNTER (OUTPATIENT)
Dept: LAB | Age: 69
End: 2024-02-22
Attending: INTERNAL MEDICINE
Payer: MEDICARE

## 2024-02-22 VITALS
BODY MASS INDEX: 45.18 KG/M2 | DIASTOLIC BLOOD PRESSURE: 82 MMHG | HEIGHT: 69 IN | SYSTOLIC BLOOD PRESSURE: 145 MMHG | WEIGHT: 305 LBS | HEART RATE: 93 BPM

## 2024-02-22 DIAGNOSIS — E05.90 T3 THYROTOXICOSIS: ICD-10-CM

## 2024-02-22 DIAGNOSIS — E05.90 T3 THYROTOXICOSIS: Primary | ICD-10-CM

## 2024-02-22 LAB
ALBUMIN SERPL-MCNC: 4.3 G/DL (ref 3.2–4.8)
ALBUMIN/GLOB SERPL: 1.6 {RATIO} (ref 1–2)
ALP LIVER SERPL-CCNC: 67 U/L
ALT SERPL-CCNC: 33 U/L
ANION GAP SERPL CALC-SCNC: 4 MMOL/L (ref 0–18)
AST SERPL-CCNC: 19 U/L (ref ?–34)
BASOPHILS # BLD AUTO: 0.05 X10(3) UL (ref 0–0.2)
BASOPHILS NFR BLD AUTO: 0.7 %
BILIRUB SERPL-MCNC: 0.4 MG/DL (ref 0.2–1.1)
BUN BLD-MCNC: 20 MG/DL (ref 9–23)
BUN/CREAT SERPL: 18 (ref 10–20)
CALCIUM BLD-MCNC: 9.5 MG/DL (ref 8.7–10.4)
CHLORIDE SERPL-SCNC: 107 MMOL/L (ref 98–112)
CO2 SERPL-SCNC: 29 MMOL/L (ref 21–32)
CREAT BLD-MCNC: 1.11 MG/DL
DEPRECATED RDW RBC AUTO: 39.4 FL (ref 35.1–46.3)
EGFRCR SERPLBLD CKD-EPI 2021: 73 ML/MIN/1.73M2 (ref 60–?)
EOSINOPHIL # BLD AUTO: 0.4 X10(3) UL (ref 0–0.7)
EOSINOPHIL NFR BLD AUTO: 5.7 %
ERYTHROCYTE [DISTWIDTH] IN BLOOD BY AUTOMATED COUNT: 12 % (ref 11–15)
FASTING STATUS PATIENT QL REPORTED: NO
GLOBULIN PLAS-MCNC: 2.7 G/DL (ref 2.8–4.4)
GLUCOSE BLD-MCNC: 188 MG/DL (ref 70–99)
HCT VFR BLD AUTO: 43 %
HGB BLD-MCNC: 14.6 G/DL
IMM GRANULOCYTES # BLD AUTO: 0.01 X10(3) UL (ref 0–1)
IMM GRANULOCYTES NFR BLD: 0.1 %
LYMPHOCYTES # BLD AUTO: 2.09 X10(3) UL (ref 1–4)
LYMPHOCYTES NFR BLD AUTO: 29.7 %
MCH RBC QN AUTO: 30.3 PG (ref 26–34)
MCHC RBC AUTO-ENTMCNC: 34 G/DL (ref 31–37)
MCV RBC AUTO: 89.2 FL
MONOCYTES # BLD AUTO: 0.6 X10(3) UL (ref 0.1–1)
MONOCYTES NFR BLD AUTO: 8.5 %
NEUTROPHILS # BLD AUTO: 3.88 X10 (3) UL (ref 1.5–7.7)
NEUTROPHILS # BLD AUTO: 3.88 X10(3) UL (ref 1.5–7.7)
NEUTROPHILS NFR BLD AUTO: 55.3 %
OSMOLALITY SERPL CALC.SUM OF ELEC: 298 MOSM/KG (ref 275–295)
PLATELET # BLD AUTO: 178 10(3)UL (ref 150–450)
POTASSIUM SERPL-SCNC: 4 MMOL/L (ref 3.5–5.1)
PROT SERPL-MCNC: 7 G/DL (ref 5.7–8.2)
RBC # BLD AUTO: 4.82 X10(6)UL
SODIUM SERPL-SCNC: 140 MMOL/L (ref 136–145)
T3FREE SERPL-MCNC: 4.31 PG/ML (ref 2.4–4.2)
T4 FREE SERPL-MCNC: 1.7 NG/DL (ref 0.8–1.7)
TSI SER-ACNC: <0.008 MIU/ML (ref 0.55–4.78)
WBC # BLD AUTO: 7 X10(3) UL (ref 4–11)

## 2024-02-22 PROCEDURE — 3008F BODY MASS INDEX DOCD: CPT | Performed by: INTERNAL MEDICINE

## 2024-02-22 PROCEDURE — 36415 COLL VENOUS BLD VENIPUNCTURE: CPT

## 2024-02-22 PROCEDURE — 3077F SYST BP >= 140 MM HG: CPT | Performed by: INTERNAL MEDICINE

## 2024-02-22 PROCEDURE — 84481 FREE ASSAY (FT-3): CPT

## 2024-02-22 PROCEDURE — 99204 OFFICE O/P NEW MOD 45 MIN: CPT | Performed by: INTERNAL MEDICINE

## 2024-02-22 PROCEDURE — 84439 ASSAY OF FREE THYROXINE: CPT

## 2024-02-22 PROCEDURE — 84443 ASSAY THYROID STIM HORMONE: CPT

## 2024-02-22 PROCEDURE — 3079F DIAST BP 80-89 MM HG: CPT | Performed by: INTERNAL MEDICINE

## 2024-02-22 PROCEDURE — 1159F MED LIST DOCD IN RCRD: CPT | Performed by: INTERNAL MEDICINE

## 2024-02-22 PROCEDURE — 85025 COMPLETE CBC W/AUTO DIFF WBC: CPT

## 2024-02-22 PROCEDURE — 84445 ASSAY OF TSI GLOBULIN: CPT

## 2024-02-22 PROCEDURE — 80053 COMPREHEN METABOLIC PANEL: CPT

## 2024-02-22 NOTE — H&P
New Patient Evaluation - History and Physical    CONSULT - Reason for Visit:  Hyperthyroidism.  Requesting Physician: Dr. Moffett.    CHIEF COMPLAINT:    Chief Complaint   Patient presents with    Consult     Hyperthyroidism, last labs 12/2023        HISTORY OF PRESENT ILLNESS:   Jerome Richards is a 68 year old male who presents with abnormal thyroid function tests since 2021.     PAST MEDICAL HISTORY:   Past Medical History:   Diagnosis Date    Acute chest pain 3/11/2020    Fracture of left ankle     High blood pressure     Screen for colon cancer 02/2018    sessile serrated adenoma (1cm) in 2018, no polyps in 2021, repeat CLN in 5years.       PAST SURGICAL HISTORY:   Past Surgical History:   Procedure Laterality Date    ANKLE FRACTURE SURGERY Left 2014    COLONOSCOPY N/A 2/20/2018    Procedure: COLONOSCOPY;  Surgeon: LAURY Wang MD;  Location: [facility] ENDOSCOPY    COLONOSCOPY N/A 6/2/2021    Procedure: COLONOSCOPY;  Surgeon: LAURY Wang MD;  Location: [facility] ENDOSCOPY       SOCIAL HISTORY:    Social History     Socioeconomic History    Marital status:    Tobacco Use    Smoking status: Never    Smokeless tobacco: Never   Vaping Use    Vaping Use: Never used   Substance and Sexual Activity    Alcohol use: Yes     Comment: rarely    Drug use: No    Sexual activity: Yes     Partners: Female       FAMILY HISTORY:   No family history on file.    CURRENT MEDICATIONS:    Current Outpatient Medications   Medication Sig Dispense Refill    metFORMIN 500 MG Oral Tab Take 1 tablet (500 mg total) by mouth 2 (two) times daily with meals. 180 tablet 1    amLODIPine 5 MG Oral Tab Take 1 tablet (5 mg total) by mouth daily. 90 tablet 1    losartan-hydroCHLOROthiazide 100-25 MG Oral Tab Take 1 tablet by mouth daily. 90 tablet 3    Blood Glucose Monitoring Suppl (ONETOUCH VERIO FLEX SYSTEM) w/Device Does not apply Kit 1 kit 2 (two) times daily. May substitute per insurance formulary 1 kit 0       ALLERGIES:  Allergies    Allergen Reactions    Bee Pollen SWELLING         ASSESSMENTS:     REVIEW OF SYSTEMS:  Constitutional: Negative for: weight change, fever, fatigue, cold/heat intolerance  Eyes: Negative for:  Visual changes, proptosis, blurring  ENT: Negative for:  dysphagia, neck swelling, dysphonia  Respiratory: Negative for:  dyspnea, cough  Cardiovascular: Negative for:  chest pain, palpitations, orthopnea  GI: Negative for:  abdominal pain, nausea, vomiting, diarrhea, constipation, bleeding  Neurology: Negative for: headache, numbness, weakness  Genito-Urinary: Negative for: dysuria, frequency  Psychiatric: Negative for:  depression, anxiety  Hematology/Lymphatics: Negative for: bruising, lower extremity edema  Endocrine: Negative for: polyuria, polydypsia  Skin: Negative for: rash, blister, cellulitis,      PHYSICAL EXAM:   Vitals:    02/22/24 1227   BP: 145/82   Pulse: 93   Weight: (!) 305 lb (138.3 kg)   Height: 5' 9\" (1.753 m)     BMI: Body mass index is 45.04 kg/m².     CONSTITUTIONAL:  awake, alert, cooperative, no apparent distress, and appears stated age  PSYCH: normal affect  EYES:  No proptosis, no ptosis, conjunctiva normal  ENT:  Normocephalic, atraumatic  NECK:  Supple, symmetrical, trachea midline, no adenopathy, thyroid symmetric, not enlarged and no tenderness  HEMATOLOGIC/LYMPHATICS:  no cervical lymphadenopathy and no supraclavicular lymphadenopathy  LUNGS: clear to auscultation bilaterally, no crackles or wheezing  CARDIOVASCULAR:  regular rate and rhythm, normal S1 and S2  ABDOMEN:  soft  SKIN:  no bruising or bleeding, no rashes and no lesions  EXTREMITIES: no edema      DATA:   Date  TSH  FT4  FT3  12/21/21 0.282  1.3  2.96    12/20/22 0.324  1.2  2.64  12/18/23 0.034  1.6  4.28             ASSESSMENT AND PLAN: This is a 68 year-old man here for 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     1. 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  - Therapy options and risks and benefits discussed: Radioactive iodine (MONTENEGRO) vs Surgery vs anti thyroid drugs (ATD) x 18 months.    -  Patient is aware of the following:  They will need life long replacement with levothyroxine after surgery and possibly after MONTENEGRO  MONTENEGRO: Risks/benefits explained to pt and questions answered. Patient is not pregnant, breastfeeding. No renal or hepatic insufficiency.  Pt understands the following risks:  - Risk for thyroiditis (1% of cases). if mild neck pain, pt to take Ibuprofen OTC; if severe pain, swelling, CP, or SOB, pt to go to ER.  - Risk for hypothyroidism: Most pts who receive radioactive iodine become hypothyroid and will need to take thyroid hormone supplements for the rest of their lives.  - Risk of recurrence of disease explained and may need further treatment; about 20% of those who receive MONTENEGRO require a 2nd dose.  - Risk of worsening ophthalmopathy explained and instructed to immediately come to the clinic.  - Pt to avoid close physical contact, especially w/ young children and pregnant women for at least 3 days after MONTENEGRO treatment due to the possibility of exposing them to low doses of radiation.   - Pt instructed and knows that he cannot get anyone pregnant/ she can’t get pregnant or breast feed for the next 6 months after receiving MONTENEGRO.    ATD:  - Side effects discussed:  > pruritus, rash, urticaria, arthralgias, arthritis, fever, abnormal taste sensation, nausea, or vomiting in up to 13 percent of patients   > agranulocytosis (prevalence of 0.1 to 0.5 %, and usually occurs within the first two months of treatment, but may occur later)  > congenital malformations associated with methimazole : fetal scalp defect, aplasia cutis , choanal atresia, tracheoesophageal fistulas   > Hepatotoxicity  discussed and the patient knows that she should stop MMI and come to the ER/call  the office if she experiences sore throat, fever, jaundice, dark urine, light stools, abdominal pain, anorexia, nausea, rash or joint pains,  - Patient understands that she needs 18 months of therapy with methimazole and that the frequency of prolonged remission among patients treated with methimazole for one to two years varies from 15 to 80 percent, but is usually 20 to 30 percent in the [place] and is more likely in patients with milder disease. During treatment, the dose will need to be adjusted  every 2 to 3 months to avoid hyperthryroidism/hypothyroidism  - Patient doesn’t smoke/Smoking cessation discussed. Patient understands that smoking can aggravate orbitopathy  - Check TSH, FT4, FT3, CMP, CBC with diff, TSI and I will follow up with patient once test results are back    Prior to this encounter, I spent over 20 minutes with preparing for the visit, reviewing documents from other specialties as well as from PCP, and going over test results and imaging studies. During the face to face encounter, I spent an additional 30 minutes which were determined for history-taking, physical examination, and orientation regarding our services. Greater than 50% of the time was spent in counseling, anticipatory guidance, and coordination of care. Patient concerns were answered to the best of my knowledge.         2/22/2024  Lisa Byrne MD

## 2024-02-25 LAB — THY STIM IMMUNO: 0.64 IU/L

## 2024-02-29 ENCOUNTER — TELEPHONE (OUTPATIENT)
Dept: ENDOCRINOLOGY CLINIC | Facility: CLINIC | Age: 69
End: 2024-02-29

## 2024-02-29 DIAGNOSIS — E05.00 GRAVES DISEASE: Primary | ICD-10-CM

## 2024-02-29 PROBLEM — E05.90 T3 THYROTOXICOSIS: Status: ACTIVE | Noted: 2024-02-29

## 2024-02-29 RX ORDER — METHIMAZOLE 10 MG/1
10 TABLET ORAL
Qty: 90 TABLET | Refills: 0 | Status: SHIPPED | OUTPATIENT
Start: 2024-02-29 | End: 2024-05-29

## 2024-03-01 NOTE — TELEPHONE ENCOUNTER
Hi!  Please let patient know that I have reviewed patient's labs. He has autoimmune hyperthyroidism. I would like to start him on 10mg of methimazole and recheck labs in 6 weeks and see him back in 3 months. Thank you!

## 2024-03-05 NOTE — TELEPHONE ENCOUNTER
Spoke to patient to relay message below - patient state understanding to start MMI 10mg daily - patient stated he received med from [employer] yesterday  Patient stated understanding to repeat labs in 6 weeks  F/U scheduled 6/11/24

## 2024-03-05 NOTE — TELEPHONE ENCOUNTER
Patient is returning the nurses call. Patient would like for the nurse to call the home phone number.

## 2024-03-07 RX ORDER — AMLODIPINE BESYLATE 5 MG/1
5 TABLET ORAL DAILY
Qty: 90 TABLET | Refills: 3 | Status: SHIPPED | OUTPATIENT
Start: 2024-03-07

## 2024-03-07 NOTE — TELEPHONE ENCOUNTER
Please review; protocol failed/ has no protocol    Please see message below for upcoming appointment.    Future Appointments   Date Time Provider Department Center   4/5/2024  9:00 AM Douglas Moffett MD [facility]              Requested Prescriptions   Pending Prescriptions Disp Refills    AMLODIPINE 5 MG Oral Tab [Pharmacy Med Name: AMLODIPINE BESYLATE TABS 5MG] 90 tablet 3     Sig: TAKE 1 TABLET DAILY       Hypertension Medications Protocol Failed - 3/6/2024 11:14 PM        Failed - Last BP reading less than 140/90     BP Readings from Last 1 Encounters:   02/22/24 145/82               Passed - CMP or BMP in past 12 months        Passed - In person appointment or virtual visit in the past 12 mos or appointment in next 3 mos     Recent Outpatient Visits              2 weeks ago T3 thyrotoxicosis    On license of [facility] Lisa Byrne MD    Office Visit    2 months ago Encounter for annual health examination    [facility] Douglas Moffett MD    Office Visit    1 year ago Encounter for annual health examination    [facility] Douglas Moffett MD    Office Visit    2 years ago Routine physical examination    [facility] Douglas Moffett MD    Office Visit    2 years ago Need for vaccination    [facility]    Nurse Only          Future Appointments         Provider Department Appt Notes    In 4 weeks Douglas Moffett MD [facility] follow up visit    In 3 months Lisa Byrne MD [facility] hyperthyroidism               Passed - EGFRCR or GFRNAA > 50     GFR Evaluation  EGFRCR: 73 , resulted on 2/22/2024             Recent Outpatient Visits              2 weeks ago T3 thyrotoxicosis    [facility]  [facility] Lisa Byrne MD    Office Visit    2 months ago Encounter for annual health examination    [facility], [facility] Douglas Moffett MD    Office Visit    1 year ago Encounter for annual health examination    [facility] Douglas Moffett MD    Office Visit    2 years ago Routine physical examination    [facility] Douglas Moffett MD    Office Visit    2 years ago Need for vaccination    [facility]    Nurse Only          Future Appointments         Provider Department Appt Notes    In 4 weeks Douglas Moffett MD [facility] follow up visit    In 3 months Lisa Byrne MD [facility], [facility]

## 2024-04-05 ENCOUNTER — OFFICE VISIT (OUTPATIENT)
Dept: INTERNAL MEDICINE CLINIC | Facility: CLINIC | Age: 69
End: 2024-04-05
Payer: MEDICARE

## 2024-04-05 ENCOUNTER — PATIENT MESSAGE (OUTPATIENT)
Dept: ENDOCRINOLOGY CLINIC | Facility: CLINIC | Age: 69
End: 2024-04-05

## 2024-04-05 VITALS
HEIGHT: 69 IN | HEART RATE: 96 BPM | BODY MASS INDEX: 45.65 KG/M2 | DIASTOLIC BLOOD PRESSURE: 84 MMHG | RESPIRATION RATE: 20 BRPM | WEIGHT: 308.19 LBS | SYSTOLIC BLOOD PRESSURE: 124 MMHG | TEMPERATURE: 98 F

## 2024-04-05 DIAGNOSIS — E05.90 HYPERTHYROIDISM: ICD-10-CM

## 2024-04-05 DIAGNOSIS — E66.01 CLASS 3 SEVERE OBESITY WITH SERIOUS COMORBIDITY AND BODY MASS INDEX (BMI) OF 45.0 TO 49.9 IN ADULT, UNSPECIFIED OBESITY TYPE (HCC): ICD-10-CM

## 2024-04-05 DIAGNOSIS — I10 ESSENTIAL HYPERTENSION: ICD-10-CM

## 2024-04-05 DIAGNOSIS — E11.9 TYPE 2 DIABETES MELLITUS WITHOUT COMPLICATION, WITHOUT LONG-TERM CURRENT USE OF INSULIN (HCC): Primary | ICD-10-CM

## 2024-04-05 LAB — HEMOGLOBIN A1C: 9.4 % (ref 4.3–5.6)

## 2024-04-05 PROCEDURE — 3046F HEMOGLOBIN A1C LEVEL >9.0%: CPT | Performed by: INTERNAL MEDICINE

## 2024-04-05 PROCEDURE — 99214 OFFICE O/P EST MOD 30 MIN: CPT | Performed by: INTERNAL MEDICINE

## 2024-04-05 PROCEDURE — 1159F MED LIST DOCD IN RCRD: CPT | Performed by: INTERNAL MEDICINE

## 2024-04-05 PROCEDURE — 83036 HEMOGLOBIN GLYCOSYLATED A1C: CPT | Performed by: INTERNAL MEDICINE

## 2024-04-05 PROCEDURE — 1160F RVW MEDS BY RX/DR IN RCRD: CPT | Performed by: INTERNAL MEDICINE

## 2024-04-05 PROCEDURE — 3008F BODY MASS INDEX DOCD: CPT | Performed by: INTERNAL MEDICINE

## 2024-04-05 PROCEDURE — 1126F AMNT PAIN NOTED NONE PRSNT: CPT | Performed by: INTERNAL MEDICINE

## 2024-04-05 PROCEDURE — 1170F FXNL STATUS ASSESSED: CPT | Performed by: INTERNAL MEDICINE

## 2024-04-05 PROCEDURE — 3079F DIAST BP 80-89 MM HG: CPT | Performed by: INTERNAL MEDICINE

## 2024-04-05 PROCEDURE — 3074F SYST BP LT 130 MM HG: CPT | Performed by: INTERNAL MEDICINE

## 2024-04-05 NOTE — PROGRESS NOTES
HPI:    Patient ID: Jerome Richards is a 68 year old male.    HPI  Patient is here for follow-up on chronic medical issues as listed below.  Last seen in the office on December 15 for Medicare annual wellness visit.  Weight was up 4 pounds.  Complete blood work was done.  Couple of things were unremarkable.  First, A1c had gone up to 10.1.  Based on that, we started patient on metformin 500 mg twice a day.  In addition, patient had suppressed TSH and elevated free T4.  He was referred to endocrinology.  Repeat testing confirmed these findings.  He was started on methimazole 10 mg a day and advised to repeat blood work in 3 months after the previous blood work on February 29.  Current medications reviewed.  Health maintenance and vaccination status reviewed.     He is doing ok. Felt some side effects initially with  the meds but that resolved. Diet is ok; about the same; tries to watch his carbs. 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 Patient does check blood pressure at home. It has been running 128/82 today. Not checking sugars.     POC A1C today is 9.4.      Patient Active Problem List   Diagnosis    Essential hypertension    BMI 45.0-49.9, adult ([facility])    Class 3 obesity due to excess calories without serious comorbidity with body mass index (BMI) of 45.0 to 49.9 in adult    Screen for colon cancer    Sigmoid polyp    T3 thyrotoxicosis    Type 2 diabetes mellitus without complication, without long-term current use of insulin (HCC)          HISTORY:  Past Medical History:   Diagnosis Date    Acute chest pain 3/11/2020    Fracture of left ankle     High blood pressure     Screen for colon cancer 02/2018    sessile serrated adenoma (1cm) in 2018, no polyps in 2021, repeat CLN in 5years.    Type 2 diabetes mellitus without complication, without long-term current use of insulin ([facility]) 4/5/2024      Past Surgical History:   Procedure  Laterality Date    ANKLE FRACTURE SURGERY Left 2014    COLONOSCOPY N/A 2/20/2018    Procedure: COLONOSCOPY;  Surgeon: LAURY Wang MD;  Location: [facility] ENDOSCOPY    COLONOSCOPY N/A 6/2/2021    Procedure: COLONOSCOPY;  Surgeon: LAURY Wang MD;  Location: [facility] ENDOSCOPY      History reviewed. No pertinent family history.   Social History     Socioeconomic History    Marital status:    Tobacco Use    Smoking status: Never    Smokeless tobacco: Never   Vaping Use    Vaping Use: Never used   Substance and Sexual Activity    Alcohol use: Yes     Comment: rarely    Drug use: No    Sexual activity: Yes     Partners: Female          Review of Systems          Current Outpatient Medications   Medication Sig Dispense Refill    amLODIPine 5 MG Oral Tab Take 1 tablet (5 mg total) by mouth daily. 90 tablet 3    methIMAzole 10 MG Oral Tab Take 1 tablet (10 mg total) by mouth daily with breakfast. 90 tablet 0    metFORMIN 500 MG Oral Tab Take 1 tablet (500 mg total) by mouth 2 (two) times daily with meals. 180 tablet 1    losartan-hydroCHLOROthiazide 100-25 MG Oral Tab Take 1 tablet by mouth daily. 90 tablet 3    Blood Glucose Monitoring Suppl (ONETOUCH VERIO FLEX SYSTEM) w/Device Does not apply Kit 1 kit 2 (two) times daily. May substitute per insurance formulary 1 kit 0     Allergies:  Allergies   Allergen Reactions    Bee Pollen SWELLING        PHYSICAL EXAM:   /84   Pulse 96   Temp 98 °F (36.7 °C) (Other)   Resp 20   Ht 5' 9\" (1.753 m)   Wt (!) 308 lb 3.2 oz (139.8 kg)   BMI 45.51 kg/m²      Physical Exam  Constitutional:       Appearance: Normal appearance. He is well-developed. He is obese.   HENT:      Nose: Nose normal.      Mouth/Throat:      Pharynx: No oropharyngeal exudate or posterior oropharyngeal erythema.   Eyes:      Conjunctiva/sclera: Conjunctivae normal.   Neck:      Vascular: No carotid bruit.   Cardiovascular:      Rate and Rhythm: Normal rate and regular rhythm.      Pulses: Normal  pulses.      Heart sounds: Normal heart sounds. No murmur heard.  Pulmonary:      Effort: Pulmonary effort is normal.      Breath sounds: Normal breath sounds. No wheezing or rales.   Abdominal:      General: Bowel sounds are normal.      Palpations: Abdomen is soft. There is no mass.      Tenderness: There is no abdominal tenderness.   Musculoskeletal:      Right lower leg: Edema present.      Left lower leg: Edema present.   Lymphadenopathy:      Cervical: No cervical adenopathy.   Skin:     General: Skin is warm and dry.      Findings: No rash.   Neurological:      General: No focal deficit present.      Mental Status: He is alert.   Psychiatric:         Mood and Affect: Mood normal.         Behavior: Behavior normal.         Thought Content: Thought content normal.          Wt Readings from Last 6 Encounters:   04/05/24 (!) 308 lb 3.2 oz (139.8 kg)   02/22/24 (!) 305 lb (138.3 kg)   12/15/23 (!) 309 lb (140.2 kg)   12/16/22 (!) 305 lb (138.3 kg)   02/24/22 (!) 311 lb (141.1 kg)   02/17/22 (!) 316 lb (143.3 kg)             ASSESSMENT/PLAN:   1. Type 2 diabetes mellitus without complication, without long-term current use of insulin (HCC)  A1c is gone from 10.1 down to 9.4.  Better but not good.  Continue working on diet, exercise, weight loss.  Increase metformin from 500 twice a day up to thousand twice a day.  Advised to taper up by doing at thousand in the morning and 500 in the evening for a week or 2 before going up to thousand twice a day.  Call with any side effects.  We did speak about initiation of statin medication in diabetics.  Discussed the indications.  We will hold off for the time being but definitely consider this in future visits.  Follow-up in 3 months.    2. Essential hypertension  Well-controlled.  Continue current treatment.    3. Hyperthyroidism  Stable.  Continue methimazole.  Follow-up with endocrinology.    4. Class 3 severe obesity with serious comorbidity and body mass index (BMI) of  45.0 to 49.9 in adult, unspecified obesity type (HCC)  Discussed the need for long-term commitment to diet, exercise, weight loss.  Discussed possible screening for sleep apnea.         Meds This Visit:  Requested Prescriptions      No prescriptions requested or ordered in this encounter       Imaging & Referrals:  None         Douglas Moffett MD

## 2024-04-08 NOTE — TELEPHONE ENCOUNTER
From: Jerome Richards  To: Lisa Byrne  Sent: 4/5/2024 11:51 AM CDT  Subject: Follow up blood test    I just wanted to check on the follow up blood work. I had on my calendar to do it around April 12. I went to my primary today and he thought it was a 90 day follow up which would be Mid May. Can you clarify?

## 2024-04-12 ENCOUNTER — LAB ENCOUNTER (OUTPATIENT)
Dept: LAB | Age: 69
End: 2024-04-12
Attending: INTERNAL MEDICINE
Payer: MEDICARE

## 2024-04-12 DIAGNOSIS — E05.00 GRAVES DISEASE: ICD-10-CM

## 2024-04-12 LAB
ALBUMIN SERPL-MCNC: 4.2 G/DL (ref 3.2–4.8)
ALBUMIN/GLOB SERPL: 1.6 {RATIO} (ref 1–2)
ALP LIVER SERPL-CCNC: 63 U/L
ALT SERPL-CCNC: 31 U/L
ANION GAP SERPL CALC-SCNC: 1 MMOL/L (ref 0–18)
AST SERPL-CCNC: 15 U/L (ref ?–34)
BASOPHILS # BLD AUTO: 0.07 X10(3) UL (ref 0–0.2)
BASOPHILS NFR BLD AUTO: 1.2 %
BILIRUB SERPL-MCNC: 0.6 MG/DL (ref 0.2–1.1)
BUN BLD-MCNC: 15 MG/DL (ref 9–23)
BUN/CREAT SERPL: 15.8 (ref 10–20)
CALCIUM BLD-MCNC: 9.2 MG/DL (ref 8.7–10.4)
CHLORIDE SERPL-SCNC: 107 MMOL/L (ref 98–112)
CO2 SERPL-SCNC: 32 MMOL/L (ref 21–32)
CREAT BLD-MCNC: 0.95 MG/DL
DEPRECATED RDW RBC AUTO: 42.6 FL (ref 35.1–46.3)
EGFRCR SERPLBLD CKD-EPI 2021: 88 ML/MIN/1.73M2 (ref 60–?)
EOSINOPHIL # BLD AUTO: 0.39 X10(3) UL (ref 0–0.7)
EOSINOPHIL NFR BLD AUTO: 6.7 %
ERYTHROCYTE [DISTWIDTH] IN BLOOD BY AUTOMATED COUNT: 12.9 % (ref 11–15)
FASTING STATUS PATIENT QL REPORTED: YES
GLOBULIN PLAS-MCNC: 2.6 G/DL (ref 2.8–4.4)
GLUCOSE BLD-MCNC: 223 MG/DL (ref 70–99)
HCT VFR BLD AUTO: 44 %
HGB BLD-MCNC: 14.3 G/DL
IMM GRANULOCYTES # BLD AUTO: 0.01 X10(3) UL (ref 0–1)
IMM GRANULOCYTES NFR BLD: 0.2 %
LYMPHOCYTES # BLD AUTO: 2.21 X10(3) UL (ref 1–4)
LYMPHOCYTES NFR BLD AUTO: 38 %
MCH RBC QN AUTO: 29.5 PG (ref 26–34)
MCHC RBC AUTO-ENTMCNC: 32.5 G/DL (ref 31–37)
MCV RBC AUTO: 90.7 FL
MONOCYTES # BLD AUTO: 0.49 X10(3) UL (ref 0.1–1)
MONOCYTES NFR BLD AUTO: 8.4 %
NEUTROPHILS # BLD AUTO: 2.65 X10 (3) UL (ref 1.5–7.7)
NEUTROPHILS # BLD AUTO: 2.65 X10(3) UL (ref 1.5–7.7)
NEUTROPHILS NFR BLD AUTO: 45.5 %
OSMOLALITY SERPL CALC.SUM OF ELEC: 298 MOSM/KG (ref 275–295)
PLATELETS.RETICULATED NFR BLD AUTO: 16.9 % (ref 0–7)
POTASSIUM SERPL-SCNC: 3.9 MMOL/L (ref 3.5–5.1)
PROT SERPL-MCNC: 6.8 G/DL (ref 5.7–8.2)
RBC # BLD AUTO: 4.85 X10(6)UL
SODIUM SERPL-SCNC: 140 MMOL/L (ref 136–145)
T3FREE SERPL-MCNC: 3 PG/ML (ref 2.4–4.2)
T4 FREE SERPL-MCNC: 1.2 NG/DL (ref 0.8–1.7)
TSI SER-ACNC: 0.09 MIU/ML (ref 0.55–4.78)
WBC # BLD AUTO: 5.8 X10(3) UL (ref 4–11)

## 2024-04-12 PROCEDURE — 36415 COLL VENOUS BLD VENIPUNCTURE: CPT

## 2024-04-12 PROCEDURE — 84481 FREE ASSAY (FT-3): CPT

## 2024-04-12 PROCEDURE — 84439 ASSAY OF FREE THYROXINE: CPT

## 2024-04-12 PROCEDURE — 84443 ASSAY THYROID STIM HORMONE: CPT

## 2024-04-12 PROCEDURE — 80053 COMPREHEN METABOLIC PANEL: CPT

## 2024-04-12 PROCEDURE — 85025 COMPLETE CBC W/AUTO DIFF WBC: CPT

## 2024-04-24 ENCOUNTER — HOSPITAL ENCOUNTER (OUTPATIENT)
Age: 69
Discharge: HOME | End: 2024-04-24
Payer: MEDICARE

## 2024-04-24 ENCOUNTER — APPOINTMENT (OUTPATIENT)
Dept: GENERAL RADIOLOGY | Age: 69
End: 2024-04-24
Attending: PHYSICIAN ASSISTANT
Payer: MEDICARE

## 2024-04-24 VITALS
TEMPERATURE: 100 F | HEART RATE: 105 BPM | SYSTOLIC BLOOD PRESSURE: 138 MMHG | OXYGEN SATURATION: 93 % | DIASTOLIC BLOOD PRESSURE: 65 MMHG | RESPIRATION RATE: 16 BRPM

## 2024-04-24 DIAGNOSIS — J11.1 INFLUENZA-LIKE ILLNESS: Primary | ICD-10-CM

## 2024-04-24 LAB
POCT INFLUENZA A: NEGATIVE
POCT INFLUENZA B: NEGATIVE

## 2024-04-24 PROCEDURE — 99214 OFFICE O/P EST MOD 30 MIN: CPT

## 2024-04-24 PROCEDURE — 99204 OFFICE O/P NEW MOD 45 MIN: CPT

## 2024-04-24 PROCEDURE — 87502 INFLUENZA DNA AMP PROBE: CPT | Performed by: PHYSICIAN ASSISTANT

## 2024-04-24 PROCEDURE — 71046 X-RAY EXAM CHEST 2 VIEWS: CPT | Performed by: PHYSICIAN ASSISTANT

## 2024-04-24 RX ORDER — BENZONATATE 100 MG/1
100 CAPSULE ORAL 3 TIMES DAILY PRN
Qty: 21 CAPSULE | Refills: 0 | Status: SHIPPED | OUTPATIENT
Start: 2024-04-24 | End: 2024-05-01

## 2024-04-24 NOTE — ED PROVIDER NOTES
Patient Seen in: [facility]      History     Chief Complaint   Patient presents with    Cough     Entered by patient    Fever     Stated Complaint: Cough    Subjective:   HPI    68-year-old male presenting for evaluation of fever, cough.  States he developed a deep, dry cough yesterday, this a.m. has had chills and fatigue.  He also had an episode of vomiting this a.m. which he feels may have been related to coughing.  He has no complaint of chest pain or shortness of breath.  He has taken Tylenol for the symptoms    Objective:   No pertinent past medical history.            No pertinent past surgical history.              No pertinent social history.            Review of Systems    Positive for stated complaint: Cough  Other systems are as noted in HPI.  Constitutional and vital signs reviewed.      All other systems reviewed and negative except as noted above.    Physical Exam     ED Triage Vitals [04/24/24 1050]   /65   Pulse 105   Resp 16   Temp 100.4 °F (38 °C)   Temp src Temporal   SpO2 93 %   O2 Device None (Room air)       Current:/65   Pulse 105   Temp 100.4 °F (38 °C) (Temporal)   Resp 16   SpO2 93%         Physical Exam  Vitals and nursing note reviewed.   Constitutional:       General: He is not in acute distress.  HENT:      Head: Normocephalic and atraumatic.      Right Ear: External ear normal.      Left Ear: External ear normal.      Nose: Nose normal.      Mouth/Throat:      Mouth: Mucous membranes are moist.   Eyes:      Extraocular Movements: Extraocular movements intact.      Pupils: Pupils are equal, round, and reactive to light.   Cardiovascular:      Rate and Rhythm: Normal rate.   Pulmonary:      Effort: Pulmonary effort is normal.      Breath sounds: No wheezing or rhonchi.   Abdominal:      General: Abdomen is flat.   Musculoskeletal:         General: Normal range of motion.      Cervical back: Normal range of motion.   Skin:     General: Skin is warm.    Neurological:      General: No focal deficit present.      Mental Status: He is alert and oriented to person, place, and time.   Psychiatric:         Mood and Affect: Mood normal.         Behavior: Behavior normal.               ED Course     Labs Reviewed   POCT FLU TEST - Normal    Narrative:     This assay is a rapid molecular in vitro test utilizing nucleic acid amplification of influenza A and B viral RNA.        68-year-old male presenting with complaint of fever, cough which started yesterday.  Patient with low-grade fever in the IC overall nontoxic-appearing.  Oxygen saturation is 93%    Ddx-influenza, pneumonia, viral illness, COVID   Influenza screen negative.  Chest x-ray with no evidence of infiltrate or abnormality.  Tessalon rx'd for cough.  We reviewed supportive care, continued monitoring and ER return precautions.          [facility]                                         Medical Decision Making      Disposition and Plan     Clinical Impression:  1. Influenza-like illness         Disposition:  Discharge  4/24/2024 12:07 pm    Follow-up:  Douglas Moffett MD  [address] [place] [id number]  [phone]                Medications Prescribed:  Discharge Medication List as of 4/24/2024 12:09 PM        START taking these medications    Details   benzonatate 100 MG Oral Cap Take 1 capsule (100 mg total) by mouth 3 (three) times daily as needed for cough., Normal, Disp-21 capsule, R-0

## 2024-05-05 DIAGNOSIS — E05.00 GRAVES DISEASE: ICD-10-CM

## 2024-05-06 NOTE — TELEPHONE ENCOUNTER
Endocrine Refill protocol for oral antihypertensive medications    Protocol Criteria:    -Appointment with Endocrinology completed in the last 6 months or scheduled in the next 3 months    -BMP or CMP has been completed in the last 12 months     -GFR is greater than or equal to 50    Verify the above has been completed or scheduled in the appropriate timeline. If so can send a 90 day supply with 1 refill.   Verify BMP or CMP has been completed in last year  Verify last GFR result     Last completed office visit:02/22/24  Next scheduled Follow up: 06/18/24  Last BMP or CMP completion date:04/12/24  GFR result:88

## 2024-05-08 RX ORDER — METHIMAZOLE 10 MG/1
10 TABLET ORAL
Qty: 90 TABLET | Refills: 0 | Status: SHIPPED | OUTPATIENT
Start: 2024-05-08 | End: 2024-07-21

## 2024-05-08 NOTE — TELEPHONE ENCOUNTER
Hi!  Please let patient know that I have reviewed his blood tests and I would him to continue his MMI 10mg and I would like him to have blood tests in 1 month. Thank you!

## 2024-06-18 ENCOUNTER — OFFICE VISIT (OUTPATIENT)
Dept: ENDOCRINOLOGY CLINIC | Facility: CLINIC | Age: 69
End: 2024-06-18

## 2024-06-18 VITALS
DIASTOLIC BLOOD PRESSURE: 74 MMHG | BODY MASS INDEX: 45.18 KG/M2 | SYSTOLIC BLOOD PRESSURE: 124 MMHG | WEIGHT: 305 LBS | HEIGHT: 69 IN | HEART RATE: 80 BPM

## 2024-06-18 DIAGNOSIS — E05.00 GRAVES DISEASE: Primary | ICD-10-CM

## 2024-06-18 PROCEDURE — 3074F SYST BP LT 130 MM HG: CPT | Performed by: INTERNAL MEDICINE

## 2024-06-18 PROCEDURE — 1159F MED LIST DOCD IN RCRD: CPT | Performed by: INTERNAL MEDICINE

## 2024-06-18 PROCEDURE — 3008F BODY MASS INDEX DOCD: CPT | Performed by: INTERNAL MEDICINE

## 2024-06-18 PROCEDURE — 3078F DIAST BP <80 MM HG: CPT | Performed by: INTERNAL MEDICINE

## 2024-06-18 PROCEDURE — 99214 OFFICE O/P EST MOD 30 MIN: CPT | Performed by: INTERNAL MEDICINE

## 2024-06-18 RX ORDER — LOSARTAN POTASSIUM AND HYDROCHLOROTHIAZIDE 25; 100 MG/1; MG/1
1 TABLET ORAL DAILY
Qty: 90 TABLET | Refills: 3 | Status: SHIPPED | OUTPATIENT
Start: 2024-06-18

## 2024-06-18 NOTE — PROGRESS NOTES
Follow-Up- Reason for Visit:  Graves' Disease.  Requesting Physician: Dr. Moffett.    CHIEF COMPLAINT:    Chief Complaint   Patient presents with    Hyperthyroidism        HISTORY OF PRESENT ILLNESS:   Jerome Richards is a 68 year old male who presents with abnormal thyroid function tests since 2021. At the last visit, I had diagnosed him with Graves' Disease and started him on MMI 10mg PO qday. 6 weeks later, we checked his blood tests and advised him to continue on this dose. He is feeling well today with no complaints.    PAST MEDICAL HISTORY:   Past Medical History:    Acute chest pain    Fracture of left ankle    High blood pressure    Screen for colon cancer    sessile serrated adenoma (1cm) in 2018, no polyps in 2021, repeat CLN in 5years.    Type 2 diabetes mellitus without complication, without long-term current use of insulin (HCC)       PAST SURGICAL HISTORY:   Past Surgical History:   Procedure Laterality Date    Ankle fracture surgery Left 2014    Colonoscopy N/A 2/20/2018    Procedure: COLONOSCOPY;  Surgeon: LAURY Wang MD;  Location: [facility] ENDOSCOPY    Colonoscopy N/A 6/2/2021    Procedure: COLONOSCOPY;  Surgeon: LAURY Wang MD;  Location: [facility] ENDOSCOPY       SOCIAL HISTORY:    Social History     Socioeconomic History    Marital status:    Tobacco Use    Smoking status: Never    Smokeless tobacco: Never   Vaping Use    Vaping status: Never Used   Substance and Sexual Activity    Alcohol use: Yes     Comment: rarely    Drug use: No    Sexual activity: Yes     Partners: Female       FAMILY HISTORY:   No family history on file.    CURRENT MEDICATIONS:    Current Outpatient Medications   Medication Sig Dispense Refill    methIMAzole 10 MG Oral Tab TAKE 1 TABLET DAILY WITH BREAKFAST 90 tablet 0    metFORMIN 1000 MG Oral Tab Take 1 tablet (1,000 mg total) by mouth 2 (two) times daily with meals. 180 tablet 1    amLODIPine 5 MG Oral Tab Take 1 tablet (5 mg total) by mouth daily. 90 tablet  3    losartan-hydroCHLOROthiazide 100-25 MG Oral Tab Take 1 tablet by mouth daily. 90 tablet 3    Blood Glucose Monitoring Suppl (ONETOUCH VERIO FLEX SYSTEM) w/Device Does not apply Kit 1 kit 2 (two) times daily. May substitute per insurance formulary 1 kit 0       ALLERGIES:  Allergies   Allergen Reactions    Bee Pollen SWELLING         ASSESSMENTS:     REVIEW OF SYSTEMS:  Constitutional: Negative for: weight change, fever, fatigue, cold/heat intolerance  Eyes: Negative for:  Visual changes, proptosis, blurring  ENT: Negative for:  dysphagia, neck swelling, dysphonia  Respiratory: Negative for:  dyspnea, cough  Cardiovascular: Negative for:  chest pain, palpitations, orthopnea  GI: Negative for:  abdominal pain, nausea, vomiting, diarrhea, constipation, bleeding  Neurology: Negative for: headache, numbness, weakness  Genito-Urinary: Negative for: dysuria, frequency  Psychiatric: Negative for:  depression, anxiety  Hematology/Lymphatics: Negative for: bruising, lower extremity edema  Endocrine: Negative for: polyuria, polydypsia  Skin: Negative for: rash, blister, cellulitis,      PHYSICAL EXAM:   Vitals:    06/18/24 1036   BP: 124/74   Pulse: 80   Weight: (!) 305 lb (138.3 kg)   Height: 5' 9\" (1.753 m)       BMI: Body mass index is 45.04 kg/m².     CONSTITUTIONAL:  awake, alert, cooperative, no apparent distress, and appears stated age  PSYCH: normal affect  SKIN:  no bruising or bleeding, no rashes and no lesions  EXTREMITIES: no edema      DATA:   Date  TSH  FT4 FT3 MMI  12/21/21 0.282  1.3 2.96    12/20/22 0.324  1.2 2.64  12/18/23 0.034  1.6 4.28  02/22/24 <0.008  1.7 4.31   04/12/24 0.088  1.2 3.00 10       02/22/24:  TSI- 0.64            ASSESSMENT AND PLAN: This is a 68 year-old man here for evaluation and management of T3 thyrotoxicosis, Graves' Disease    1. Hyperthyroidism:  - Etiology: Graves' Disease  NM scan and uptake showed: Not necessary  - Therapy options and risks and benefits discussed:  Radioactive iodine (MONTENEGRO) vs Surgery vs anti thyroid drugs (ATD) x 18 months.    -  Patient is aware of the following:  They will need life long replacement with levothyroxine after surgery and possibly after MONTENEGRO  MONTENEGRO: Risks/benefits explained to pt and questions answered. Patient is not pregnant, breastfeeding. No renal or hepatic insufficiency.  Pt understands the following risks:  - Risk for thyroiditis (1% of cases). if mild neck pain, pt to take Ibuprofen OTC; if severe pain, swelling, CP, or SOB, pt to go to ER.  - Risk for hypothyroidism: Most pts who receive radioactive iodine become hypothyroid and will need to take thyroid hormone supplements for the rest of their lives.  - Risk of recurrence of disease explained and may need further treatment; about 20% of those who receive MNOTENEGRO require a 2nd dose.  - Risk of worsening ophthalmopathy explained and instructed to immediately come to the clinic.  - Pt to avoid close physical contact, especially w/ young children and pregnant women for at least 3 days after MONTENEGRO treatment due to the possibility of exposing them to low doses of radiation.   - Pt instructed and knows that he cannot get anyone pregnant/ she can’t get pregnant or breast feed for the next 6 months after receiving MONTENEGRO.  He elected to be started on MMI.    ATD:  Continue on MMI 10mg PO qday (started on 2/29/24)   - Side effects discussed:  > pruritus, rash, urticaria, arthralgias, arthritis, fever, abnormal taste sensation, nausea, or vomiting in up to 13 percent of patients   > agranulocytosis (prevalence of 0.1 to 0.5 %, and usually occurs within the first two months of treatment, but may occur later)  > congenital malformations associated with methimazole : fetal scalp defect, aplasia cutis , choanal atresia, tracheoesophageal fistulas   > Hepatotoxicity  discussed and the patient knows that she should stop MMI and come to the ER/call the office if she experiences sore throat, fever, jaundice, dark  urine, light stools, abdominal pain, anorexia, nausea, rash or joint pains,  - Patient understands that she needs 18 months of therapy with methimazole and that the frequency of prolonged remission among patients treated with methimazole for one to two years varies from 15 to 80 percent, but is usually 20 to 30 percent in the [place] and is more likely in patients with milder disease. During treatment, the dose will need to be adjusted  every 2 to 3 months to avoid hyperthryroidism/hypothyroidism  - Patient doesn’t smoke/Smoking cessation discussed. Patient understands that smoking can aggravate orbitopathy  - Check TSH, FT4, FT3, CMP, CBC with diff, today and adjust MMI dose and I will follow up with patient once test results are back    Return to clinic in 6 months    Prior to this encounter, I spent over 20 minutes with preparing for the visit, reviewing documents from other specialties as well as from PCP, and going over test results and imaging studies. During the face to face encounter, I spent an additional 30 minutes which were determined for history-taking, physical examination, and orientation regarding our services. Greater than 50% of the time was spent in counseling, anticipatory guidance, and coordination of care. Patient concerns were answered to the best of my knowledge.         6/18/24  Lisa Byrne MD

## 2024-06-19 NOTE — TELEPHONE ENCOUNTER
Refill passed per [facility] protocol.    Requested Prescriptions   Pending Prescriptions Disp Refills    LOSARTAN-HYDROCHLOROTHIAZIDE 100-25 MG Oral Tab [Pharmacy Med Name: LOSARTAN/ HYDROCHLOROTHIAZIDE TABS 100/25MG] 90 tablet 3     Sig: TAKE 1 TABLET DAILY       Hypertension Medications Protocol Passed - 6/16/2024 11:50 PM        Passed - CMP or BMP in past 12 months        Passed - Last BP reading less than 140/90     BP Readings from Last 1 Encounters:   06/18/24 124/74               Passed - In person appointment or virtual visit in the past 12 mos or appointment in next 3 mos     Recent Outpatient Visits              Today Graves disease    [facility] Lisa Byrne MD    Office Visit    2 months ago Type 2 diabetes mellitus without complication, without long-term current use of insulin (HCC)    [facility] Douglas Moffett MD    Office Visit    3 months ago T3 thyrotoxicosis    [facility] Jen Gastelum Sudha K, MD    Office Visit    6 months ago Encounter for annual health examination    [facility]    Office Visit    1 year ago Encounter for annual health examination    [facility] Douglas Chow MD    Office Visit          Future Appointments         Provider Department Appt Notes    In 1 month Douglas Moffett MD [facility] follow up    In 6 months [facility]                    Passed - EGFRCR or GFRNAA > 50     GFR Evaluation  EGFRCR: 88 , resulted on 4/12/2024

## 2024-06-20 ENCOUNTER — LAB ENCOUNTER (OUTPATIENT)
Dept: LAB | Age: 69
End: 2024-06-20
Attending: INTERNAL MEDICINE

## 2024-06-20 DIAGNOSIS — E05.00 GRAVES DISEASE: ICD-10-CM

## 2024-06-20 LAB
ALBUMIN SERPL-MCNC: 4.4 G/DL (ref 3.2–4.8)
ALBUMIN/GLOB SERPL: 1.7 {RATIO} (ref 1–2)
ALP LIVER SERPL-CCNC: 53 U/L
ALT SERPL-CCNC: 43 U/L
ANION GAP SERPL CALC-SCNC: 5 MMOL/L (ref 0–18)
AST SERPL-CCNC: 24 U/L (ref ?–34)
BASOPHILS # BLD: 0.07 X10(3) UL (ref 0–0.2)
BASOPHILS NFR BLD: 1 %
BILIRUB SERPL-MCNC: 0.7 MG/DL (ref 0.2–1.1)
BUN BLD-MCNC: 16 MG/DL (ref 9–23)
BUN/CREAT SERPL: 18.4 (ref 10–20)
CALCIUM BLD-MCNC: 9.4 MG/DL (ref 8.7–10.4)
CHLORIDE SERPL-SCNC: 107 MMOL/L (ref 98–112)
CO2 SERPL-SCNC: 30 MMOL/L (ref 21–32)
CREAT BLD-MCNC: 0.87 MG/DL
DEPRECATED RDW RBC AUTO: 46.1 FL (ref 35.1–46.3)
EGFRCR SERPLBLD CKD-EPI 2021: 95 ML/MIN/1.73M2 (ref 60–?)
EOSINOPHIL # BLD: 0.36 X10(3) UL (ref 0–0.7)
EOSINOPHIL NFR BLD: 5 %
ERYTHROCYTE [DISTWIDTH] IN BLOOD BY AUTOMATED COUNT: 13.5 % (ref 11–15)
FASTING STATUS PATIENT QL REPORTED: YES
GLOBULIN PLAS-MCNC: 2.6 G/DL (ref 2–3.5)
GLUCOSE BLD-MCNC: 149 MG/DL (ref 70–99)
HCT VFR BLD AUTO: 42.4 %
HGB BLD-MCNC: 14.6 G/DL
LYMPHOCYTES NFR BLD: 1.99 X10(3) UL (ref 1–4)
LYMPHOCYTES NFR BLD: 25 %
MCH RBC QN AUTO: 32.2 PG (ref 26–34)
MCHC RBC AUTO-ENTMCNC: 34.4 G/DL (ref 31–37)
MCV RBC AUTO: 93.4 FL
MONOCYTES # BLD: 0.64 X10(3) UL (ref 0.1–1)
MONOCYTES NFR BLD: 9 %
MORPHOLOGY: NORMAL
MYELOCYTES # BLD: 0.07 X10(3) UL
MYELOCYTES NFR BLD: 1 %
NEUTROPHILS # BLD AUTO: 3.71 X10 (3) UL (ref 1.5–7.7)
NEUTROPHILS NFR BLD: 56 %
NEUTS HYPERSEG # BLD: 3.98 X10(3) UL (ref 1.5–7.7)
OSMOLALITY SERPL CALC.SUM OF ELEC: 298 MOSM/KG (ref 275–295)
PLATELET # BLD AUTO: 129 10(3)UL (ref 150–450)
PLATELET MORPHOLOGY: NORMAL
PLATELETS.RETICULATED NFR BLD AUTO: 17 % (ref 0–7)
POTASSIUM SERPL-SCNC: 4.4 MMOL/L (ref 3.5–5.1)
PROT SERPL-MCNC: 7 G/DL (ref 5.7–8.2)
RBC # BLD AUTO: 4.54 X10(6)UL
SODIUM SERPL-SCNC: 142 MMOL/L (ref 136–145)
T3FREE SERPL-MCNC: 2.98 PG/ML (ref 2.4–4.2)
T4 FREE SERPL-MCNC: 1.1 NG/DL (ref 0.8–1.7)
TOTAL CELLS COUNTED BLD: 100
TSI SER-ACNC: 2.3 MIU/ML (ref 0.55–4.78)
VARIANT LYMPHS NFR BLD MANUAL: 3 %
WBC # BLD AUTO: 7.1 X10(3) UL (ref 4–11)

## 2024-06-20 PROCEDURE — 36415 COLL VENOUS BLD VENIPUNCTURE: CPT

## 2024-06-20 PROCEDURE — 84481 FREE ASSAY (FT-3): CPT

## 2024-06-20 PROCEDURE — 84439 ASSAY OF FREE THYROXINE: CPT

## 2024-06-20 PROCEDURE — 85027 COMPLETE CBC AUTOMATED: CPT

## 2024-06-20 PROCEDURE — 80053 COMPREHEN METABOLIC PANEL: CPT

## 2024-06-20 PROCEDURE — 85025 COMPLETE CBC W/AUTO DIFF WBC: CPT

## 2024-06-20 PROCEDURE — 84443 ASSAY THYROID STIM HORMONE: CPT

## 2024-06-20 PROCEDURE — 85007 BL SMEAR W/DIFF WBC COUNT: CPT

## 2024-07-08 ENCOUNTER — TELEPHONE (OUTPATIENT)
Dept: INTERNAL MEDICINE CLINIC | Facility: CLINIC | Age: 69
End: 2024-07-08

## 2024-07-08 NOTE — TELEPHONE ENCOUNTER
Patient due for his annual physical exam and HGA1C. Patient last seen in office on 4/5/24, next routine follow up scheduled with [facility] on 9/6/24.

## 2024-07-18 DIAGNOSIS — E05.00 GRAVES DISEASE: ICD-10-CM

## 2024-07-19 NOTE — TELEPHONE ENCOUNTER
Endocrine refill protocol for medications for hypothyroidism and hyperthyroidism    Protocol Criteria: PASSED    Appointment with Endocrinology completed in the last 12 months or scheduled in the next 6 months     Verify appointment has been completed or scheduled in the appropriate timeline. If so can send a 90 day supply with 1 refill per provider protocol.    Normal TSH result in the past 12 months   Review recent telephone encounters and mychart communications with patient to ensure a dose change has not occurred since last office visit that was not updated in the medication history list   Last completed office visit:6/18/2024 Lisa Byrne MD   Next scheduled Follow up:   Future Appointments   Date Time Provider Department Center   10/30/2024  9:00 AM Douglas Moffett MD [facility]   12/19/2024  9:45 AM Lisa Byrne MD [facility]      Last TSH result:   TSH   Date Value Ref Range Status   06/20/2024 2.296 0.550 - 4.780 mIU/mL Final

## 2024-07-21 RX ORDER — METHIMAZOLE 5 MG/1
7.5 TABLET ORAL
Qty: 135 TABLET | Refills: 0 | Status: SHIPPED | OUTPATIENT
Start: 2024-07-21 | End: 2024-10-19

## 2024-07-22 RX ORDER — METHIMAZOLE 10 MG/1
10 TABLET ORAL
Qty: 90 TABLET | Refills: 3 | OUTPATIENT
Start: 2024-07-22

## 2024-07-22 NOTE — TELEPHONE ENCOUNTER
HI!  Please let patient know that I have reviewed the blood tests that he had done about 1 months ago and I would like to decrease his MMI dose from 10mg to 7.5mg and recheck labs in 6 weeks. Thank you!

## 2024-07-24 NOTE — TELEPHONE ENCOUNTER
LM and sent MC with message below - patient advised that RX sent and labs ordered  Patient advised to contact clinic with questions

## 2024-09-03 ENCOUNTER — LAB ENCOUNTER (OUTPATIENT)
Dept: LAB | Age: 69
End: 2024-09-03
Attending: INTERNAL MEDICINE
Payer: MEDICARE

## 2024-09-03 DIAGNOSIS — E05.00 GRAVES DISEASE: ICD-10-CM

## 2024-09-03 LAB
ALBUMIN SERPL-MCNC: 4.5 G/DL (ref 3.2–4.8)
ALBUMIN/GLOB SERPL: 1.6 {RATIO} (ref 1–2)
ALP LIVER SERPL-CCNC: 56 U/L
ALT SERPL-CCNC: 54 U/L
ANION GAP SERPL CALC-SCNC: 9 MMOL/L (ref 0–18)
AST SERPL-CCNC: 31 U/L (ref ?–34)
BASOPHILS # BLD AUTO: 0.07 X10(3) UL (ref 0–0.2)
BASOPHILS NFR BLD AUTO: 0.9 %
BILIRUB SERPL-MCNC: 0.9 MG/DL (ref 0.2–1.1)
BUN BLD-MCNC: 17 MG/DL (ref 9–23)
BUN/CREAT SERPL: 17 (ref 10–20)
CALCIUM BLD-MCNC: 9.6 MG/DL (ref 8.7–10.4)
CHLORIDE SERPL-SCNC: 105 MMOL/L (ref 98–112)
CO2 SERPL-SCNC: 27 MMOL/L (ref 21–32)
CREAT BLD-MCNC: 1 MG/DL
DEPRECATED RDW RBC AUTO: 43.6 FL (ref 35.1–46.3)
EGFRCR SERPLBLD CKD-EPI 2021: 82 ML/MIN/1.73M2 (ref 60–?)
EOSINOPHIL # BLD AUTO: 0.42 X10(3) UL (ref 0–0.7)
EOSINOPHIL NFR BLD AUTO: 5.3 %
ERYTHROCYTE [DISTWIDTH] IN BLOOD BY AUTOMATED COUNT: 12.6 % (ref 11–15)
FASTING STATUS PATIENT QL REPORTED: YES
GLOBULIN PLAS-MCNC: 2.8 G/DL (ref 2–3.5)
GLUCOSE BLD-MCNC: 187 MG/DL (ref 70–99)
HCT VFR BLD AUTO: 44.8 %
HGB BLD-MCNC: 15 G/DL
IMM GRANULOCYTES # BLD AUTO: 0.01 X10(3) UL (ref 0–1)
IMM GRANULOCYTES NFR BLD: 0.1 %
LYMPHOCYTES # BLD AUTO: 2.59 X10(3) UL (ref 1–4)
LYMPHOCYTES NFR BLD AUTO: 32.7 %
MCH RBC QN AUTO: 31.4 PG (ref 26–34)
MCHC RBC AUTO-ENTMCNC: 33.5 G/DL (ref 31–37)
MCV RBC AUTO: 93.7 FL
MONOCYTES # BLD AUTO: 0.52 X10(3) UL (ref 0.1–1)
MONOCYTES NFR BLD AUTO: 6.6 %
NEUTROPHILS # BLD AUTO: 4.31 X10 (3) UL (ref 1.5–7.7)
NEUTROPHILS # BLD AUTO: 4.31 X10(3) UL (ref 1.5–7.7)
NEUTROPHILS NFR BLD AUTO: 54.4 %
OSMOLALITY SERPL CALC.SUM OF ELEC: 298 MOSM/KG (ref 275–295)
PLATELET # BLD AUTO: 146 10(3)UL (ref 150–450)
POTASSIUM SERPL-SCNC: 4.8 MMOL/L (ref 3.5–5.1)
PROT SERPL-MCNC: 7.3 G/DL (ref 5.7–8.2)
RBC # BLD AUTO: 4.78 X10(6)UL
SODIUM SERPL-SCNC: 141 MMOL/L (ref 136–145)
T3FREE SERPL-MCNC: 2.72 PG/ML (ref 2.4–4.2)
T4 FREE SERPL-MCNC: 1.2 NG/DL (ref 0.8–1.7)
TSI SER-ACNC: 3.81 MIU/ML (ref 0.55–4.78)
WBC # BLD AUTO: 7.9 X10(3) UL (ref 4–11)

## 2024-09-03 PROCEDURE — 84439 ASSAY OF FREE THYROXINE: CPT

## 2024-09-03 PROCEDURE — 80053 COMPREHEN METABOLIC PANEL: CPT

## 2024-09-03 PROCEDURE — 36415 COLL VENOUS BLD VENIPUNCTURE: CPT

## 2024-09-03 PROCEDURE — 84443 ASSAY THYROID STIM HORMONE: CPT

## 2024-09-03 PROCEDURE — 85025 COMPLETE CBC W/AUTO DIFF WBC: CPT

## 2024-09-03 PROCEDURE — 84481 FREE ASSAY (FT-3): CPT

## 2024-09-19 NOTE — TELEPHONE ENCOUNTER
Please review; protocol failed/No Protocol    Requested Prescriptions   Pending Prescriptions Disp Refills    METFORMIN HCL 1000 MG Oral Tab [Pharmacy Med Name: METFORMIN HCL TABS 1000MG] 180 tablet 3     Sig: TAKE 1 TABLET TWICE A DAY WITH MEALS       Diabetes Medication Protocol Failed - 9/15/2024 11:35 PM        Failed - Last A1C < 7.5 and within past 6 months     Lab Results   Component Value Date    A1C 9.4 (A) 04/05/2024             Passed - In person appointment or virtual visit in the past 6 mos or appointment in next 3 mos     Recent Outpatient Visits              3 months ago [facility]    [facility]    Office Visit    5 months ago Type 2 diabetes mellitus without complication, without long-term current use of insulin (HCC)    [facility]    Office Visit    7 months ago T3 thyrotoxicosis    [facility] [place] CelestineLisa Estrada MD    Office Visit    9 months ago [facility] health [facility]    Office Visit    1 year ago Encounter for annual health examination    [facility]    Office Visit          Future Appointments         Provider Department Appt Notes    In 1 month Douglas Moffett MD [facility] followup and annual physical-    In 3 months Lisa Byrne MD [facility] Follow up                    Passed - Microalbumin procedure in past 12 months or taking ACE/ARB        Passed - EGFRCR or GFRNAA > 50     GFR Evaluation  EGFRCR: 82 , resulted on 9/3/2024          Passed - GFR in the past 12 months           Future Appointments         Provider Department Appt Notes    In 1  Douglas Calvin MD [facility], [facility] followup and annual physical-    In 3 months Lisa Byrne MD [facility], [facility] Follow up          Recent Outpatient Visits              3 months ago Graves disease    [facility], [facility] Lisa Byrne MD    Office Visit    5 months ago Type 2 diabetes mellitus without complication, without long-term current use of insulin (HCC)    [facility] Douglas Moffett MD    Office Visit    7 months ago T3 thyrotoxicosis    [facility] Lisa Byrne MD    Office Visit    9 months ago Encounter for annual health examination    [facility]    Office Visit    1 year ago Encounter for annual health examination    [facility] Douglas Cohw MD    Office Visit

## 2024-10-01 ENCOUNTER — TELEPHONE (OUTPATIENT)
Dept: INTERNAL MEDICINE CLINIC | Facility: CLINIC | Age: 69
End: 2024-10-01

## 2024-10-01 DIAGNOSIS — E05.00 GRAVES DISEASE: ICD-10-CM

## 2024-10-01 NOTE — TELEPHONE ENCOUNTER
Reached patient to discuss statin use in diabetes. Patient is flagging on insurance report as candidate for statin therapy.     Discussed guidelines and recommendations for statin use in diabetes. Discussed risks vs benefits. Patient states PCP has mentioned this and they plan to readdress at upcoming appointment. He prefers to speak to PCP prior to initiating therapy. Patient denies any questions or concerns with medications at this time.

## 2024-10-03 RX ORDER — METHIMAZOLE 5 MG/1
10 TABLET ORAL DAILY
Qty: 180 TABLET | Refills: 1 | Status: SHIPPED | OUTPATIENT
Start: 2024-10-03

## 2024-10-03 NOTE — TELEPHONE ENCOUNTER
Endocrine refill protocol for medications for hypothyroidism and hyperthyroidism    Protocol Criteria:  PASSED Reason: N/A    If all below requirements are met, send a 90-day supply with 1 refill per provider protocol.    Verify appointment with Endocrinology completed in the last 12 months or scheduled in the next 6 months.    Normal TSH result in the past 12 months   Review recent telephone encounters and mychart communications with patient to ensure a dose change has not occurred since last office visit that was not updated in the medication history list     Last completed office visit:6/18/2024 Lisa Byrne MD   Next scheduled Follow up:   Future Appointments   Date Time Provider Department Center   10/30/2024  2:00 PM Douglas Moffett MD [facility] [place]   12/19/2024  9:45 AM Lisa Byrne MD [facility] [place]      Last TSH result:   TSH   Date Value Ref Range Status   09/03/2024 3.811 0.550 - 4.780 mIU/mL Final

## 2024-10-30 ENCOUNTER — OFFICE VISIT (OUTPATIENT)
Dept: INTERNAL MEDICINE CLINIC | Facility: CLINIC | Age: 69
End: 2024-10-30
Payer: MEDICARE

## 2024-10-30 VITALS
SYSTOLIC BLOOD PRESSURE: 148 MMHG | DIASTOLIC BLOOD PRESSURE: 86 MMHG | BODY MASS INDEX: 45.44 KG/M2 | HEART RATE: 92 BPM | WEIGHT: 306.81 LBS | RESPIRATION RATE: 20 BRPM | TEMPERATURE: 99 F | HEIGHT: 69 IN

## 2024-10-30 DIAGNOSIS — E66.01 CLASS 3 SEVERE OBESITY WITH SERIOUS COMORBIDITY AND BODY MASS INDEX (BMI) OF 45.0 TO 49.9 IN ADULT, UNSPECIFIED OBESITY TYPE (HCC): ICD-10-CM

## 2024-10-30 DIAGNOSIS — E11.9 TYPE 2 DIABETES MELLITUS WITHOUT COMPLICATION, WITHOUT LONG-TERM CURRENT USE OF INSULIN (HCC): ICD-10-CM

## 2024-10-30 DIAGNOSIS — E05.90 HYPERTHYROIDISM: ICD-10-CM

## 2024-10-30 DIAGNOSIS — I10 ESSENTIAL HYPERTENSION: ICD-10-CM

## 2024-10-30 DIAGNOSIS — E66.813 CLASS 3 SEVERE OBESITY WITH SERIOUS COMORBIDITY AND BODY MASS INDEX (BMI) OF 45.0 TO 49.9 IN ADULT, UNSPECIFIED OBESITY TYPE (HCC): ICD-10-CM

## 2024-10-30 DIAGNOSIS — Z12.5 SCREENING FOR PROSTATE CANCER: ICD-10-CM

## 2024-10-30 DIAGNOSIS — Z00.00 ENCOUNTER FOR ANNUAL HEALTH EXAMINATION: Primary | ICD-10-CM

## 2024-10-30 DIAGNOSIS — N48.1 BALANITIS: ICD-10-CM

## 2024-10-30 LAB — HEMOGLOBIN A1C: 8.9 % (ref 4.3–5.6)

## 2024-10-30 PROCEDURE — 99213 OFFICE O/P EST LOW 20 MIN: CPT | Performed by: INTERNAL MEDICINE

## 2024-10-30 PROCEDURE — 3008F BODY MASS INDEX DOCD: CPT | Performed by: INTERNAL MEDICINE

## 2024-10-30 PROCEDURE — 3077F SYST BP >= 140 MM HG: CPT | Performed by: INTERNAL MEDICINE

## 2024-10-30 PROCEDURE — G0439 PPPS, SUBSEQ VISIT: HCPCS | Performed by: INTERNAL MEDICINE

## 2024-10-30 PROCEDURE — 3079F DIAST BP 80-89 MM HG: CPT | Performed by: INTERNAL MEDICINE

## 2024-10-30 PROCEDURE — 1160F RVW MEDS BY RX/DR IN RCRD: CPT | Performed by: INTERNAL MEDICINE

## 2024-10-30 PROCEDURE — 83036 HEMOGLOBIN GLYCOSYLATED A1C: CPT | Performed by: INTERNAL MEDICINE

## 2024-10-30 PROCEDURE — 3052F HG A1C>EQUAL 8.0%<EQUAL 9.0%: CPT | Performed by: INTERNAL MEDICINE

## 2024-10-30 PROCEDURE — 96160 PT-FOCUSED HLTH RISK ASSMT: CPT | Performed by: INTERNAL MEDICINE

## 2024-10-30 PROCEDURE — 1159F MED LIST DOCD IN RCRD: CPT | Performed by: INTERNAL MEDICINE

## 2024-10-30 PROCEDURE — 1126F AMNT PAIN NOTED NONE PRSNT: CPT | Performed by: INTERNAL MEDICINE

## 2024-10-30 PROCEDURE — 1170F FXNL STATUS ASSESSED: CPT | Performed by: INTERNAL MEDICINE

## 2024-10-30 RX ORDER — SEMAGLUTIDE 0.68 MG/ML
INJECTION, SOLUTION SUBCUTANEOUS
Qty: 3 EACH | Refills: 3 | Status: SHIPPED | OUTPATIENT
Start: 2024-10-30

## 2024-10-30 NOTE — PROGRESS NOTES
Subjective:   Jerome Richards is a 69 year old male who presents for a MA AHA (Medicare Advantage Annual Health Assessment) and scheduled follow up of multiple significant but stable problems.     Patient is here requesting Medicare advantage AHA visit and follow-up on chronic medical problems as listed below.  I last saw him in the office on April 5.  At that time A1c was 9.4.  We increased the metformin from 500 up to 1000 mg twice a day.  Advised to follow-up in 3 months.  He returns today.  Since his last visit he did see endocrinology in June.  However that was strictly for the hyperthyroidism.  Current medications reviewed.  Health maintenance and vaccination status reviewed.  Advanced directives reviewed.  Patient is due for full blood work.    Point-of-care A1c testing done today is 8.9.    No major issues. No side effects from meds. He is very active; he walks, plays golf, does lawn care; walks 2-4 miles a day. He does some dumbbell weights. His weight is unchanged. Does not check sugar. Patient does check blood pressure at home. It has been running consistently good 115-125/78. 127/78 today. Rare use of EtOH.     History/Other:   Fall Risk Assessment:   He has been screened for Falls and is low risk.      Cognitive Assessment:   He had a completely normal cognitive assessment - see flowsheet entries     Functional Ability/Status:   Jerome Richards has a completely normal functional assessment. See flowsheet for details.        Depression Screening (PHQ):  PHQ-2 SCORE: 0  , done 10/30/2024   If you checked off any problems, how difficult have these problems made it for you to do your work, take care of things at home, or get along with other people?: Not difficult at all    Last [place] Suicide Screening on 10/30/2024 was No Risk.          Advanced Directives:   He does have a Living Will but we do NOT have it on file in Epic.    He does have a POA but we do NOT have it on file in  Epic.    Discussed Advance Care Planning with patient (and family/surrogate if present). Standard forms made available to patient in After Visit Summary.      Patient Active Problem List   Diagnosis    Essential hypertension    BMI 45.0-49.9, adult ([facility])    Class 3 obesity due to excess calories without serious comorbidity with body mass index (BMI) of 45.0 to 49.9 in adult    Screen for colon cancer    Sigmoid polyp    T3 thyrotoxicosis    Type 2 diabetes mellitus without complication, without long-term current use of insulin (HCC)    Graves disease     Allergies:  He is allergic to bee pollen.    Current Medications:  Outpatient Medications Marked as Taking for the 10/30/24 encounter (Office Visit) with Douglas Moffett MD   Medication Sig    methIMAzole 5 MG Oral Tab Take 2 tablets (10 mg total) by mouth daily. (Patient taking differently: Take 1.5 tablets (7.5 mg total) by mouth daily.)    metFORMIN HCl 1000 MG Oral Tab Take 1 tablet (1,000 mg total) by mouth 2 (two) times daily with meals.    LOSARTAN-HYDROCHLOROTHIAZIDE 100-25 MG Oral Tab TAKE 1 TABLET DAILY    amLODIPine 5 MG Oral Tab Take 1 tablet (5 mg total) by mouth daily.    Blood Glucose Monitoring Suppl (ONETOUCH VERIO FLEX SYSTEM) w/Device Does not apply Kit 1 kit 2 (two) times daily. May substitute per insurance formulary       Medical History:  He  has a past medical history of Acute chest pain (3/11/2020), Fracture of left ankle, High blood pressure, Screen for colon cancer (02/2018), and Type 2 diabetes mellitus without complication, without long-term current use of insulin ([facility]) (4/5/2024).  Surgical History:  He  has a past surgical history that includes colonoscopy (N/A, 2/20/2018); ankle fracture surgery (Left, 2014); and colonoscopy (N/A, 6/2/2021).   Family History:  His family history is not on file.  Social History:  He  reports that he has never smoked. He has never used smokeless tobacco. He reports current alcohol use. He reports that  he does not use drugs.    Tobacco:  He has never smoked tobacco.    CAGE Alcohol Screen:   CAGE screening score of 0 on 10/23/2024, showing low risk of alcohol abuse.      Patient Care Team:  Douglas Moffett MD as PCP - General (Internal Medicine)    Review of Systems       Objective:   Physical Exam  Constitutional:       Appearance: Normal appearance. He is well-developed. He is obese.   HENT:      Right Ear: Tympanic membrane and ear canal normal.      Left Ear: Tympanic membrane and ear canal normal.      Nose: Nose normal.      Mouth/Throat:      Pharynx: No oropharyngeal exudate or posterior oropharyngeal erythema.   Eyes:      Conjunctiva/sclera: Conjunctivae normal.      Pupils: Pupils are equal, round, and reactive to light.   Neck:      Thyroid: No thyromegaly.      Vascular: No carotid bruit.   Cardiovascular:      Rate and Rhythm: Normal rate and regular rhythm.      Pulses: Normal pulses.      Heart sounds: Normal heart sounds. No murmur heard.  Pulmonary:      Effort: Pulmonary effort is normal.      Breath sounds: Normal breath sounds. No wheezing or rales.   Abdominal:      General: Bowel sounds are normal.      Palpations: Abdomen is soft. There is no mass.      Tenderness: There is no abdominal tenderness.   Genitourinary:     Testes: Normal.      Comments: Scrotal skin is dry and irritated. Penis is withdrawn. Foreskin is irritated  Musculoskeletal:      Right lower leg: No edema.      Left lower leg: No edema.   Lymphadenopathy:      Cervical: No cervical adenopathy.   Skin:     General: Skin is warm and dry.      Findings: No rash.   Neurological:      General: No focal deficit present.      Mental Status: He is alert.      Cranial Nerves: No cranial nerve deficit.      Coordination: Coordination normal.   Psychiatric:         Mood and Affect: Mood normal.         Behavior: Behavior normal.         Thought Content: Thought content normal.          /82 (BP Location: Left arm, Patient  Position: Sitting, Cuff Size: large)   Pulse 92   Temp 98.6 °F (37 °C) (Other)   Resp 20   Ht 5' 9\" (1.753 m)   Wt (!) 306 lb 12.8 oz (139.2 kg)   BMI 45.31 kg/m²  Estimated body mass index is 45.31 kg/m² as calculated from the following:    Height as of this encounter: 5' 9\" (1.753 m).    Weight as of this encounter: 306 lb 12.8 oz (139.2 kg).    Medicare Hearing Assessment:   Hearing Screening    Screening Method: Questionnaire  I have a problem hearing over the telephone: No I have trouble following the conversations when two or more people are talking at the same time: No   I have trouble understanding things on the TV: No I have to strain to understand conversations: No   I have to worry about missing the telephone ring or doorbell: No I have trouble hearing conversations in a noisy background such as a crowded room or restaurant: No   I get confused about where sounds come from: No I misunderstand some words in a sentence and need to ask people to repeat themselves: No   I especially have trouble understanding the speech of women and children: No I have trouble understanding the speaker in a large room such as at a meeting or place of Religion: No   Many people I talk to seem to mumble (or don't speak clearly): No People get annoyed because I misunderstand what they say: No   I misunderstand what others are saying and make inappropriate responses: No I avoid social activities because I cannot hear well and fear I will reply improperly: No   Family members and friends have told me they think I may have hearing loss: No             Visual Acuity:   Right Eye Visual Acuity: Corrected Right Eye Chart Acuity: 20/25   Left Eye Visual Acuity: Corrected Left Eye Chart Acuity: 20/25   Both Eyes Visual Acuity: Corrected Both Eyes Chart Acuity: 20/25   Able To Tolerate Visual Acuity: Yes        Assessment & Plan:   Jerome Richards is a 69 year old male who presents for a Medicare Assessment.     1. Encounter  for annual health examination  Physical exam remarkable for obesity, elevated blood pressure, GI issues.  Encouraged diet, exercise, weight loss.    2. Type 2 diabetes mellitus without complication, without long-term current use of insulin (HCC)  Better but still not controlled.  Various options discussed.  Continue metformin at current dose.  Will add Ozempic 0.25 mg injection once a week for 4 weeks and going to 0.5 mg once a week.  Follow-up in 3 months.  We will check other blood work at this time.  It is my medical opinion that the Ozempic would be an ideal medication for pain poorly controlled morbidly obese diabetic.  - POC Hemoglobin A1C  - Lipid Panel; Future  - Microalb/Creat Ratio, Random Urine; Future    3. Essential hypertension  Elevated here in the office but well-controlled at home.  Continue current treatment.  Follow-up in 3 months.    4. Hyperthyroidism  Continue methimazole.  Follow-up with endocrinology.    5. Class 3 severe obesity with serious comorbidity and body mass index (BMI) of 45.0 to 49.9 in adult, unspecified obesity type (HCC)  Encouraged diet, exercise, weight loss.  We will start Ozempic as above.    6. Balanitis  Patient with ongoing issues with balanitis.  He states in the past he gets better when we treated but it comes back once we stop treating it.  It would likely improve with better diabetic control.  This issue would make him a poor candidate for medication such as Jardiance.    7. Screening for prostate cancer  Check PSA.  - PSA Total, Screen; Future    The patient indicates understanding of these issues and agrees to the plan.  Reinforced healthy diet, lifestyle, and exercise.      No follow-ups on file.     Douglas Moffett MD, 10/30/2024     Supplementary Documentation:   General Health:  In the past six months, have you lost more than 10 pounds without trying?: (Patient-Rptd) 2 - No  Has your appetite been poor?: (Patient-Rptd) No  Type of Diet: (Patient-Rptd) Low  Carb  How does the patient maintain a good energy level?: (Patient-Rptd) Daily Walks  How would you describe your daily physical activity?: (Patient-Rptd) Moderate  How would you describe your current health state?: (Patient-Rptd) Good  How do you maintain positive mental well-being?: (Patient-Rptd) Social Interaction;Puzzles;Visiting Friends;Visiting Family  On a scale of 0 to 10, with 0 being no pain and 10 being severe pain, what is your pain level?: (Patient-Rptd) 0 - (None)  In the past six months, have you experienced urine leakage?: (Patient-Rptd) 0-No  At any time do you feel concerned for the safety/well-being of yourself and/or your children, in your home or elsewhere?: No  Have you had any immunizations at another office such as Influenza, Hepatitis B, Tetanus, or Pneumococcal?: (Patient-Rptd) Yes    Health Maintenance   Topic Date Due    MA Annual Health Assessment  01/01/2024    HTN: BP Follow-Up  11/30/2024    Diabetes Care Foot Exam  12/15/2024    Diabetes Care: Microalb/Creat Ratio  12/18/2024    COVID-19 Vaccine (8 - 2023-24 season) 01/09/2025    Diabetes Care A1C  01/30/2025    Diabetes Care Dilated Eye Exam  02/01/2025    Diabetes Care: GFR  09/03/2025    PSA  12/18/2025    Colorectal Cancer Screening  06/02/2026    Influenza Vaccine  Completed    Annual Depression Screening  Completed    Fall Risk Screening (Annual)  Completed    Pneumococcal Vaccine: 65+ Years  Completed    Zoster Vaccines  Completed

## 2024-11-01 ENCOUNTER — LAB ENCOUNTER (OUTPATIENT)
Dept: LAB | Age: 69
End: 2024-11-01
Attending: INTERNAL MEDICINE
Payer: MEDICARE

## 2024-11-01 DIAGNOSIS — E11.9 TYPE 2 DIABETES MELLITUS WITHOUT COMPLICATION, WITHOUT LONG-TERM CURRENT USE OF INSULIN (HCC): ICD-10-CM

## 2024-11-01 LAB
CHOLEST SERPL-MCNC: 177 MG/DL (ref ?–200)
CREAT UR-SCNC: 96.6 MG/DL
FASTING PATIENT LIPID ANSWER: YES
HDLC SERPL-MCNC: 35 MG/DL (ref 40–59)
LDLC SERPL CALC-MCNC: 119 MG/DL (ref ?–100)
MICROALBUMIN UR-MCNC: 0.4 MG/DL
MICROALBUMIN/CREAT 24H UR-RTO: 4.1 UG/MG (ref ?–30)
NONHDLC SERPL-MCNC: 142 MG/DL (ref ?–130)
TRIGL SERPL-MCNC: 124 MG/DL (ref 30–149)
VLDLC SERPL CALC-MCNC: 22 MG/DL (ref 0–30)

## 2024-11-01 PROCEDURE — 80061 LIPID PANEL: CPT

## 2024-11-01 PROCEDURE — 82570 ASSAY OF URINE CREATININE: CPT

## 2024-11-01 PROCEDURE — 36415 COLL VENOUS BLD VENIPUNCTURE: CPT

## 2024-11-01 PROCEDURE — 82043 UR ALBUMIN QUANTITATIVE: CPT

## 2024-12-20 ENCOUNTER — LAB ENCOUNTER (OUTPATIENT)
Dept: LAB | Age: 69
End: 2024-12-20
Attending: INTERNAL MEDICINE
Payer: MEDICARE

## 2024-12-20 DIAGNOSIS — Z12.5 SCREENING FOR PROSTATE CANCER: Primary | ICD-10-CM

## 2024-12-20 DIAGNOSIS — E05.00 GRAVES DISEASE: ICD-10-CM

## 2024-12-20 LAB
ALBUMIN SERPL-MCNC: 4.7 G/DL (ref 3.2–4.8)
ALBUMIN/GLOB SERPL: 1.9 {RATIO} (ref 1–2)
ALP LIVER SERPL-CCNC: 54 U/L
ALT SERPL-CCNC: 34 U/L
ANION GAP SERPL CALC-SCNC: 9 MMOL/L (ref 0–18)
AST SERPL-CCNC: 18 U/L (ref ?–34)
BASOPHILS # BLD AUTO: 0.09 X10(3) UL (ref 0–0.2)
BASOPHILS NFR BLD AUTO: 1.4 %
BILIRUB SERPL-MCNC: 0.7 MG/DL (ref 0.2–1.1)
BUN BLD-MCNC: 18 MG/DL (ref 9–23)
BUN/CREAT SERPL: 17.1 (ref 10–20)
CALCIUM BLD-MCNC: 9.8 MG/DL (ref 8.7–10.4)
CHLORIDE SERPL-SCNC: 105 MMOL/L (ref 98–112)
CO2 SERPL-SCNC: 27 MMOL/L (ref 21–32)
COMPLEXED PSA SERPL-MCNC: 0.86 NG/ML (ref ?–4)
CREAT BLD-MCNC: 1.05 MG/DL
DEPRECATED RDW RBC AUTO: 43.8 FL (ref 35.1–46.3)
EGFRCR SERPLBLD CKD-EPI 2021: 78 ML/MIN/1.73M2 (ref 60–?)
EOSINOPHIL # BLD AUTO: 0.47 X10(3) UL (ref 0–0.7)
EOSINOPHIL NFR BLD AUTO: 7.1 %
ERYTHROCYTE [DISTWIDTH] IN BLOOD BY AUTOMATED COUNT: 12.5 % (ref 11–15)
FASTING STATUS PATIENT QL REPORTED: YES
GLOBULIN PLAS-MCNC: 2.5 G/DL (ref 2–3.5)
GLUCOSE BLD-MCNC: 161 MG/DL (ref 70–99)
HCT VFR BLD AUTO: 44.5 %
HGB BLD-MCNC: 14.5 G/DL
IMM GRANULOCYTES # BLD AUTO: 0.01 X10(3) UL (ref 0–1)
IMM GRANULOCYTES NFR BLD: 0.2 %
LYMPHOCYTES # BLD AUTO: 2.42 X10(3) UL (ref 1–4)
LYMPHOCYTES NFR BLD AUTO: 36.7 %
MCH RBC QN AUTO: 31 PG (ref 26–34)
MCHC RBC AUTO-ENTMCNC: 32.6 G/DL (ref 31–37)
MCV RBC AUTO: 95.1 FL
MONOCYTES # BLD AUTO: 0.52 X10(3) UL (ref 0.1–1)
MONOCYTES NFR BLD AUTO: 7.9 %
NEUTROPHILS # BLD AUTO: 3.09 X10 (3) UL (ref 1.5–7.7)
NEUTROPHILS # BLD AUTO: 3.09 X10(3) UL (ref 1.5–7.7)
NEUTROPHILS NFR BLD AUTO: 46.7 %
OSMOLALITY SERPL CALC.SUM OF ELEC: 297 MOSM/KG (ref 275–295)
PLATELETS.RETICULATED NFR BLD AUTO: 13.8 % (ref 0–7)
POTASSIUM SERPL-SCNC: 4.1 MMOL/L (ref 3.5–5.1)
PROT SERPL-MCNC: 7.2 G/DL (ref 5.7–8.2)
RBC # BLD AUTO: 4.68 X10(6)UL
SODIUM SERPL-SCNC: 141 MMOL/L (ref 136–145)
T3FREE SERPL-MCNC: 3.13 PG/ML (ref 2.4–4.2)
T4 FREE SERPL-MCNC: 1.2 NG/DL (ref 0.8–1.7)
TSI SER-ACNC: 3.24 UIU/ML (ref 0.55–4.78)
WBC # BLD AUTO: 6.6 X10(3) UL (ref 4–11)

## 2024-12-20 PROCEDURE — 84439 ASSAY OF FREE THYROXINE: CPT

## 2024-12-20 PROCEDURE — 85025 COMPLETE CBC W/AUTO DIFF WBC: CPT

## 2024-12-20 PROCEDURE — 36415 COLL VENOUS BLD VENIPUNCTURE: CPT

## 2024-12-20 PROCEDURE — 84443 ASSAY THYROID STIM HORMONE: CPT

## 2024-12-20 PROCEDURE — 84481 FREE ASSAY (FT-3): CPT

## 2024-12-20 PROCEDURE — 80053 COMPREHEN METABOLIC PANEL: CPT

## 2025-01-27 ENCOUNTER — LAB REQUISITION (OUTPATIENT)
Dept: LAB | Facility: HOSPITAL | Age: 70
End: 2025-01-27
Payer: MEDICARE

## 2025-01-27 DIAGNOSIS — D48.5 NEOPLASM OF UNCERTAIN BEHAVIOR OF SKIN: ICD-10-CM

## 2025-01-27 PROCEDURE — 88305 TISSUE EXAM BY PATHOLOGIST: CPT | Performed by: DERMATOLOGY

## 2025-01-30 ENCOUNTER — OFFICE VISIT (OUTPATIENT)
Dept: ENDOCRINOLOGY CLINIC | Facility: CLINIC | Age: 70
End: 2025-01-30
Payer: MEDICARE

## 2025-01-30 VITALS
DIASTOLIC BLOOD PRESSURE: 76 MMHG | BODY MASS INDEX: 44.88 KG/M2 | HEART RATE: 83 BPM | SYSTOLIC BLOOD PRESSURE: 131 MMHG | HEIGHT: 69 IN | WEIGHT: 303 LBS

## 2025-01-30 DIAGNOSIS — E05.00 GRAVES DISEASE: Primary | ICD-10-CM

## 2025-01-30 DIAGNOSIS — E05.90 T3 THYROTOXICOSIS: ICD-10-CM

## 2025-01-30 PROBLEM — D69.6 THROMBOCYTOPENIA: Chronic | Status: ACTIVE | Noted: 2025-01-30

## 2025-01-30 PROCEDURE — 3078F DIAST BP <80 MM HG: CPT | Performed by: INTERNAL MEDICINE

## 2025-01-30 PROCEDURE — 99214 OFFICE O/P EST MOD 30 MIN: CPT | Performed by: INTERNAL MEDICINE

## 2025-01-30 PROCEDURE — 3075F SYST BP GE 130 - 139MM HG: CPT | Performed by: INTERNAL MEDICINE

## 2025-01-30 PROCEDURE — 3008F BODY MASS INDEX DOCD: CPT | Performed by: INTERNAL MEDICINE

## 2025-01-30 NOTE — PROGRESS NOTES
Follow-Up- Reason for Visit:  Graves' Disease.  Requesting Physician: Dr. Moffett.    CHIEF COMPLAINT:    Chief Complaint   Patient presents with    Graves        HISTORY OF PRESENT ILLNESS:   Jerome Richards is a 69 year old male who presents with abnormal thyroid function tests since 2021. At the last visit, I had diagnosed him with Graves' Disease and started him on MMI 10mg PO qday. 6 weeks later, we checked his blood tests and advised him to continue on this dose.     Since that visit, I had decreased his MMI to 7.5mg and he is still feeling well.     PAST MEDICAL HISTORY:   Past Medical History:    Acute chest pain    Fracture of left ankle    High blood pressure    Screen for colon cancer    sessile serrated adenoma (1cm) in 2018, no polyps in 2021, repeat CLN in 5years.    Type 2 diabetes mellitus without complication, without long-term current use of insulin (HCC)       PAST SURGICAL HISTORY:   Past Surgical History:   Procedure Laterality Date    Ankle fracture surgery Left 2014    Colonoscopy N/A 2/20/2018    Procedure: COLONOSCOPY;  Surgeon: LAURY Wang MD;  Location: [facility] ENDOSCOPY    Colonoscopy N/A 6/2/2021    Procedure: COLONOSCOPY;  Surgeon: LAURY Wang MD;  Location: [facility] ENDOSCOPY       SOCIAL HISTORY:    Social History     Socioeconomic History    Marital status:    Tobacco Use    Smoking status: Never    Smokeless tobacco: Never   Vaping Use    Vaping status: Never Used   Substance and Sexual Activity    Alcohol use: Yes     Comment: rarely    Drug use: No    Sexual activity: Yes     Partners: Female       FAMILY HISTORY:   No family history on file.    CURRENT MEDICATIONS:    Current Outpatient Medications   Medication Sig Dispense Refill    rosuvastatin 5 MG Oral Tab Take 1 tablet (5 mg total) by mouth nightly. 90 tablet 1    semaglutide (OZEMPIC, 0.25 OR 0.5 MG/DOSE,) 2 MG/3ML Subcutaneous Solution Pen-injector Inject 0.25 mg subcutaneously once a week for 4 weeks, then go  up to 0.5 mg a week. 3 each 3    methIMAzole 5 MG Oral Tab Take 2 tablets (10 mg total) by mouth daily. (Patient taking differently: Take 1.5 tablets (7.5 mg total) by mouth daily.) 180 tablet 1    metFORMIN HCl 1000 MG Oral Tab Take 1 tablet (1,000 mg total) by mouth 2 (two) times daily with meals. 180 tablet 3    LOSARTAN-HYDROCHLOROTHIAZIDE 100-25 MG Oral Tab TAKE 1 TABLET DAILY 90 tablet 3    amLODIPine 5 MG Oral Tab Take 1 tablet (5 mg total) by mouth daily. 90 tablet 3    Blood Glucose Monitoring Suppl (ONETOUCH VERIO FLEX SYSTEM) w/Device Does not apply Kit 1 kit 2 (two) times daily. May substitute per insurance formulary 1 kit 0       ALLERGIES:  Allergies   Allergen Reactions    Bee Pollen SWELLING         ASSESSMENTS:     REVIEW OF SYSTEMS:  Constitutional: Negative for: weight change, fever, fatigue, cold/heat intolerance  Eyes: Negative for:  Visual changes, proptosis, blurring  ENT: Negative for:  dysphagia, neck swelling, dysphonia  Respiratory: Negative for:  dyspnea, cough  Cardiovascular: Negative for:  chest pain, palpitations, orthopnea  GI: Negative for:  abdominal pain, nausea, vomiting, diarrhea, constipation, bleeding  Neurology: Negative for: headache, numbness, weakness  Genito-Urinary: Negative for: dysuria, frequency  Psychiatric: Negative for:  depression, anxiety  Hematology/Lymphatics: Negative for: bruising, lower extremity edema  Endocrine: Negative for: polyuria, polydypsia  Skin: Negative for: rash, blister, cellulitis,      PHYSICAL EXAM:   Vitals:    01/30/25 1256   BP: 131/76   Pulse: 83   Weight: (!) 303 lb (137.4 kg)   Height: 5' 9\" (1.753 m)       BMI: Body mass index is 44.75 kg/m².     CONSTITUTIONAL:  awake, alert, cooperative, no apparent distress, and appears stated age  PSYCH: normal affect  SKIN:  no bruising or bleeding, no rashes and no lesions  EXTREMITIES: no edema      DATA:    Date  TSH  FT4 FT3 MMI  12/21/21 0.282  1.3 2.96    12/20/22 0.324  1.2 2.64  12/18/23 0.034  1.6 4.28  02/22/24 <0.008  1.7 4.31   04/12/24 0.088  1.2 3.00 10   06/20/24 2.296  1.1 2.98 10   09/03/24 3.811  1.2 2.72 7.5  12/20/24 3.243  1.2 3.13 7.5    02/22/24:  TSI- 0.64            ASSESSMENT AND PLAN: This is a 69 year-old man here for follow-up of management of T3 thyrotoxicosis, Graves' Disease    1. Hyperthyroidism:  - Etiology: Graves' Disease  NM scan and uptake showed: Not necessary  - Therapy options and risks and benefits discussed: Radioactive iodine (MONTENEGRO) vs Surgery vs anti thyroid drugs (ATD) x 18 months.    -  Patient is aware of the following:  They will need life long replacement with levothyroxine after surgery and possibly after MONTENEGRO  MONTENEGRO: Risks/benefits explained to pt and questions answered. Patient is not pregnant, breastfeeding. No renal or hepatic insufficiency.  Pt understands the following risks:  - Risk for thyroiditis (1% of cases). if mild neck pain, pt to take Ibuprofen OTC; if severe pain, swelling, CP, or SOB, pt to go to ER.  - Risk for hypothyroidism: Most pts who receive radioactive iodine become hypothyroid and will need to take thyroid hormone supplements for the rest of their lives.  - Risk of recurrence of disease explained and may need further treatment; about 20% of those who receive MONTENEGRO require a 2nd dose.  - Risk of worsening ophthalmopathy explained and instructed to immediately come to the clinic.  - Pt to avoid close physical contact, especially w/ young children and pregnant women for at least 3 days after MONTENEGRO treatment due to the possibility of exposing them to low doses of radiation.   - Pt instructed and knows that he cannot get anyone pregnant/ she can’t get pregnant or breast feed for the next 6 months after receiving MONTENEGRO.  He elected to be started on MMI.    ATD:  Decrease MMI 7.5mg PO qday to 5mg PO qday (started on 2/29/24)   - Side effects discussed:  >  pruritus, rash, urticaria, arthralgias, arthritis, fever, abnormal taste sensation, nausea, or vomiting in up to 13 percent of patients   > agranulocytosis (prevalence of 0.1 to 0.5 %, and usually occurs within the first two months of treatment, but may occur later)  > congenital malformations associated with methimazole : fetal scalp defect, aplasia cutis , choanal atresia, tracheoesophageal fistulas   > Hepatotoxicity  discussed and the patient knows that she should stop MMI and come to the ER/call the office if she experiences sore throat, fever, jaundice, dark urine, light stools, abdominal pain, anorexia, nausea, rash or joint pains,  - Patient understands that she needs 18 months of therapy with methimazole and that the frequency of prolonged remission among patients treated with methimazole for one to two years varies from 15 to 80 percent, but is usually 20 to 30 percent in the [place] and is more likely in patients with milder disease. During treatment, the dose will need to be adjusted  every 2 to 3 months to avoid hyperthryroidism/hypothyroidism  - Patient doesn’t smoke/Smoking cessation discussed. Patient understands that smoking can aggravate orbitopathy  - Check TSH, FT4, FT3 in 6 weeks      Return to clinic in 6 weeks    Prior to this encounter, I spent over 15 minutes with preparing for the visit, including reviewing documents from other specialties as well as from PCP and going over test results and imaging studies. During the face to face encounter, I spent an additional 15 minutes which were determined for follow-up. Greater than 50% of the time was spent in counseling, anticipatory guidance, and coordination of care. Patient concerns were answered to the best of my knowledge.       1/30/25  Lisa Byrne MD

## 2025-03-04 PROCEDURE — 88305 TISSUE EXAM BY PATHOLOGIST: CPT | Performed by: DERMATOLOGY

## 2025-03-06 ENCOUNTER — LAB REQUISITION (OUTPATIENT)
Dept: LAB | Facility: HOSPITAL | Age: 70
End: 2025-03-06
Payer: MEDICARE

## 2025-03-06 DIAGNOSIS — D22.5 MELANOCYTIC NEVI OF TRUNK: ICD-10-CM

## 2025-03-13 DIAGNOSIS — E05.00 GRAVES DISEASE: ICD-10-CM

## 2025-03-14 ENCOUNTER — OFFICE VISIT (OUTPATIENT)
Dept: INTERNAL MEDICINE CLINIC | Facility: CLINIC | Age: 70
End: 2025-03-14
Payer: MEDICARE

## 2025-03-14 VITALS
RESPIRATION RATE: 20 BRPM | SYSTOLIC BLOOD PRESSURE: 138 MMHG | OXYGEN SATURATION: 97 % | HEIGHT: 69 IN | TEMPERATURE: 98 F | WEIGHT: 306 LBS | HEART RATE: 86 BPM | BODY MASS INDEX: 45.32 KG/M2 | DIASTOLIC BLOOD PRESSURE: 82 MMHG

## 2025-03-14 DIAGNOSIS — E66.813 CLASS 3 SEVERE OBESITY WITH SERIOUS COMORBIDITY AND BODY MASS INDEX (BMI) OF 45.0 TO 49.9 IN ADULT, UNSPECIFIED OBESITY TYPE (HCC): ICD-10-CM

## 2025-03-14 DIAGNOSIS — E05.90 HYPERTHYROIDISM: ICD-10-CM

## 2025-03-14 DIAGNOSIS — E66.01 CLASS 3 SEVERE OBESITY WITH SERIOUS COMORBIDITY AND BODY MASS INDEX (BMI) OF 45.0 TO 49.9 IN ADULT, UNSPECIFIED OBESITY TYPE (HCC): ICD-10-CM

## 2025-03-14 DIAGNOSIS — I10 ESSENTIAL HYPERTENSION: ICD-10-CM

## 2025-03-14 DIAGNOSIS — E11.9 TYPE 2 DIABETES MELLITUS WITHOUT COMPLICATION, WITHOUT LONG-TERM CURRENT USE OF INSULIN (HCC): Primary | ICD-10-CM

## 2025-03-14 LAB — HEMOGLOBIN A1C: 9.1 % (ref 4.3–5.6)

## 2025-03-14 PROCEDURE — 1126F AMNT PAIN NOTED NONE PRSNT: CPT | Performed by: INTERNAL MEDICINE

## 2025-03-14 PROCEDURE — 3046F HEMOGLOBIN A1C LEVEL >9.0%: CPT | Performed by: INTERNAL MEDICINE

## 2025-03-14 PROCEDURE — 3075F SYST BP GE 130 - 139MM HG: CPT | Performed by: INTERNAL MEDICINE

## 2025-03-14 PROCEDURE — 1160F RVW MEDS BY RX/DR IN RCRD: CPT | Performed by: INTERNAL MEDICINE

## 2025-03-14 PROCEDURE — 3008F BODY MASS INDEX DOCD: CPT | Performed by: INTERNAL MEDICINE

## 2025-03-14 PROCEDURE — G2211 COMPLEX E/M VISIT ADD ON: HCPCS | Performed by: INTERNAL MEDICINE

## 2025-03-14 PROCEDURE — 83036 HEMOGLOBIN GLYCOSYLATED A1C: CPT | Performed by: INTERNAL MEDICINE

## 2025-03-14 PROCEDURE — 3079F DIAST BP 80-89 MM HG: CPT | Performed by: INTERNAL MEDICINE

## 2025-03-14 PROCEDURE — 1159F MED LIST DOCD IN RCRD: CPT | Performed by: INTERNAL MEDICINE

## 2025-03-14 PROCEDURE — 99214 OFFICE O/P EST MOD 30 MIN: CPT | Performed by: INTERNAL MEDICINE

## 2025-03-14 PROCEDURE — 1170F FXNL STATUS ASSESSED: CPT | Performed by: INTERNAL MEDICINE

## 2025-03-14 RX ORDER — ATOVAQUONE AND PROGUANIL HYDROCHLORIDE 250; 100 MG/1; MG/1
TABLET, FILM COATED ORAL
COMMUNITY
Start: 2025-02-11

## 2025-03-14 NOTE — PATIENT INSTRUCTIONS
Increase the Ozempic dosing from 0.5 up to 1.0 mg/week.  Contact the office at the end of the first month and let us know how you are doing in regards to your sugars and side effects.  We may increase up to 2.0 mg at that time.

## 2025-03-14 NOTE — PROGRESS NOTES
HPI:    Patient ID: Jerome Richards is a 69 year old male.    HPI    Patient returns to the office today to discuss chronic medical issues as listed on the active problem list below.    Patient last seen in the office by me on  October 30 of last year for Medicare annual wellness visit.  At that time, A1c was 8.9.  During the last visit, the following changes were made: Initiation of Ozempic initially 0.25 mg a week for 4 weeks and up to 0.5.  Since the last visit, the patient has seen the following doctors: Endocrinology on January 30 for follow-up on Graves' disease.    POC A1C today is 9.1.    Today, the patient offers the following complaints: He is still on the 0.5 mg dose of Ozempic. He does not have any side effects but does not notice much difference. Not checking glucose. Patient does check blood pressure at home. It has been running around 126/80, 125-135/80-82  Patient describes diet as better. He tries to limits the carbs. Also portion control. Weight is unchanged.   For exercise, the patient is more active. GOes to the gym for the treadmill and weights.   Tobacco and alcohol use reviewed.   Current medications reviewed.   Health maintenance issues reviewed.  He is going to [facility] next week. He will be starting the malaria medication.     Wt Readings from Last 6 Encounters:   03/14/25 (!) 306 lb (138.8 kg)   01/30/25 (!) 303 lb (137.4 kg)   10/30/24 (!) 306 lb 12.8 oz (139.2 kg)   06/18/24 (!) 305 lb (138.3 kg)   04/05/24 (!) 308 lb 3.2 oz (139.8 kg)   02/22/24 (!) 305 lb (138.3 kg)       Patient Active Problem List   Diagnosis    Essential hypertension    BMI 45.0-49.9, adult (HCC)    Class 3 obesity due to excess calories without serious comorbidity with body mass index (BMI) of 45.0 to 49.9 in adult    Screen for colon cancer    Sigmoid polyp    T3 thyrotoxicosis    Type 2 diabetes mellitus without complication, without long-term current use of insulin (HCC)    Graves disease    Thrombocytopenia  (HCC)        HISTORY:  Past Medical History:    Acute chest pain    Fracture of left ankle    High blood pressure    Screen for colon cancer    sessile serrated adenoma (1cm) in 2018, no polyps in 2021, repeat CLN in 5years.    Type 2 diabetes mellitus without complication, without long-term current use of insulin (HCC)      Past Surgical History:   Procedure Laterality Date    Ankle fracture surgery Left 2014    Colonoscopy N/A 2/20/2018    Procedure: COLONOSCOPY;  Surgeon: LAURY Wang MD;  Location: [facility] ENDOSCOPY    Colonoscopy N/A 6/2/2021    Procedure: COLONOSCOPY;  Surgeon: LAURY Wang MD;  Location: [facility] ENDOSCOPY      History reviewed. No pertinent family history.   Social History     Socioeconomic History    Marital status:    Tobacco Use    Smoking status: Never    Smokeless tobacco: Never   Vaping Use    Vaping status: Never Used   Substance and Sexual Activity    Alcohol use: Yes     Comment: rarely    Drug use: No    Sexual activity: Yes     Partners: Female          Review of Systems          Current Outpatient Medications   Medication Sig Dispense Refill    rosuvastatin 5 MG Oral Tab Take 1 tablet (5 mg total) by mouth nightly. 90 tablet 1    semaglutide (OZEMPIC, 0.25 OR 0.5 MG/DOSE,) 2 MG/3ML Subcutaneous Solution Pen-injector Inject 0.25 mg subcutaneously once a week for 4 weeks, then go up to 0.5 mg a week. 3 each 3    methIMAzole 5 MG Oral Tab Take 2 tablets (10 mg total) by mouth daily. 180 tablet 1    metFORMIN HCl 1000 MG Oral Tab Take 1 tablet (1,000 mg total) by mouth 2 (two) times daily with meals. 180 tablet 3    LOSARTAN-HYDROCHLOROTHIAZIDE 100-25 MG Oral Tab TAKE 1 TABLET DAILY 90 tablet 3    amLODIPine 5 MG Oral Tab Take 1 tablet (5 mg total) by mouth daily. 90 tablet 3    Blood Glucose Monitoring Suppl (ONETOUCH VERIO FLEX SYSTEM) w/Device Does not apply Kit 1 kit 2 (two) times daily. May substitute per insurance formulary 1 kit 0    Atovaquone-Proguanil HCl 250-100 MG  Oral Tab TAKE ONE TABLET BY MOUTH DAILY STARTING 1 TO 2 DAYS BEFORE TRAVEL, DAILY DURING TRAVEL, AND FOR 7 DAYS AFTER TRAVEL (Patient not taking: Reported on 3/14/2025)       Allergies:Allergies[1]     PHYSICAL EXAM:   /82 (BP Location: Left arm, Patient Position: Sitting, Cuff Size: large)   Pulse 86   Temp 97.8 °F (36.6 °C) (Other)   Resp 20   Ht 5' 9\" (1.753 m)   Wt (!) 306 lb (138.8 kg)   SpO2 97%   BMI 45.19 kg/m²      Physical Exam  Constitutional:       Appearance: Normal appearance. He is well-developed. He is obese.   HENT:      Nose: Nose normal.      Mouth/Throat:      Pharynx: No oropharyngeal exudate or posterior oropharyngeal erythema.   Eyes:      Conjunctiva/sclera: Conjunctivae normal.   Neck:      Vascular: No carotid bruit.   Cardiovascular:      Rate and Rhythm: Normal rate and regular rhythm.      Pulses: Normal pulses.      Heart sounds: Normal heart sounds. No murmur heard.  Pulmonary:      Effort: Pulmonary effort is normal.      Breath sounds: Normal breath sounds. No wheezing or rales.   Abdominal:      General: Bowel sounds are normal.      Palpations: Abdomen is soft. There is no mass.      Tenderness: There is no abdominal tenderness.   Musculoskeletal:      Right lower leg: No edema.      Left lower leg: Edema present.   Lymphadenopathy:      Cervical: No cervical adenopathy.   Skin:     General: Skin is warm and dry.      Findings: No rash.   Neurological:      General: No focal deficit present.      Mental Status: He is alert.   Psychiatric:         Mood and Affect: Mood normal.         Behavior: Behavior normal.         Thought Content: Thought content normal.                 ASSESSMENT/PLAN:   1. Type 2 diabetes mellitus without complication, without long-term current use of insulin (HCC)  Diabetes is still not controlled at all.  He states he is making improvements in his diet and exercise.  He is on the high-dose metformin and the Ozempic at 0.5.  A1c 9.1.  Will  increase the Ozempic up to 1.0 mg/week.  That.  Contact the office after 1 month at that dose with an update on sugars and possible side effects.  At that time, we will likely increase up to 2.0 mg.  If the blood sugar begins to go too low, we will stop the metformin.  I would like to get him on a higher dose for better control of the diabetes as well as weight loss.  Follow-up here in 3 months.  - POC Hemoglobin A1C    2. Essential hypertension  Reasonably controlled in the office.  Continue same medications.     3. Class 3 severe obesity with serious comorbidity and body mass index (BMI) of 45.0 to 49.9 in adult, unspecified obesity type (HCC)  Weight has not really changed.  He is made some positive changes in diet and exercise.  Continue with that.  Work harder.  Increase Ozempic from 0.5 to 1.0 mg/week.  Likely increase to 2.0 after a month as stated above.    4. Hyperthyroidism  Continue current dose of methimazole.  Follow-up with endocrinology.             Meds This Visit:  Requested Prescriptions      No prescriptions requested or ordered in this encounter       Imaging & Referrals:  None         Douglas Moffett MD        [1]   Allergies  Allergen Reactions    Bee Pollen SWELLING

## 2025-03-17 ENCOUNTER — TELEPHONE (OUTPATIENT)
Dept: ENDOCRINOLOGY CLINIC | Facility: CLINIC | Age: 70
End: 2025-03-17

## 2025-03-17 RX ORDER — AMLODIPINE BESYLATE 5 MG/1
5 TABLET ORAL DAILY
Qty: 90 TABLET | Refills: 3 | Status: SHIPPED | OUTPATIENT
Start: 2025-03-17

## 2025-03-17 NOTE — TELEPHONE ENCOUNTER
Current Outpatient Medications   Medication Sig Dispense Refill           New Prescription Request                     methIMAzole 5 MG Oral Tab Take 2 tablets (10 mg total) by mouth daily. 180 tablet 1

## 2025-03-17 NOTE — TELEPHONE ENCOUNTER
Endocrine refill protocol for medications for hypothyroidism and hyperthyroidism    Protocol Criteria:  PASSED     If all below requirements are met, send a 90-day supply with 1 refill per provider protocol.    Verify appointment with Endocrinology completed in the last 12 months or scheduled in the next 6 months.    Normal TSH result in the past 12 months   Review recent telephone encounters and mychart communications with patient to ensure a dose change has not occurred since last office visit that was not updated in the medication history list     Last completed office visit:1/30/2025 Lisa Byrne MD   Next scheduled Follow up:   Future Appointments   Date Time Provider Department Center   4/10/2025  9:40 AM Lisa Byrne MD [facility]      Last TSH result:   TSH   Date Value Ref Range Status   12/20/2024 3.243 0.550 - 4.780 uIU/mL Final

## 2025-03-17 NOTE — TELEPHONE ENCOUNTER
Refill Per Protocol     Requested Prescriptions   Pending Prescriptions Disp Refills    AMLODIPINE 5 MG Oral Tab [Pharmacy Med Name: AMLODIPINE BESYLATE TABS 5MG] 90 tablet 3     Sig: TAKE 1 TABLET DAILY       Hypertension Medications Protocol Passed - 3/17/2025  2:21 PM        Passed - CMP or BMP in past 12 months        Passed - Last BP reading less than 140/90     BP Readings from Last 1 Encounters:   03/14/25 138/82               Passed - In person appointment or virtual visit in the past 12 mos or appointment in next 3 mos     Recent Outpatient Visits              3 days ago Type 2 diabetes mellitus without complication, without long-term current use of insulin ([facility])    [facility]    Office Visit    1 month ago Graves disease    [place]    Office Visit    4 months ago Encounter for annual health examination    [facility] Douglas Moff[facility] MD    Office Visit    9 months ago Graves disease    [facility] Lisa Hill MD    Office Visit    11 months ago Type 2 diabetes mellitus without complication, without long-term current use of insulin ([facility])    [facility]    Office Visit          Future Appointments         Provider Department Appt Notes    [facility]    In 3 months Douglas Moffett MD [facility] follow up visit                    Passed - EGFRCR or GFRNAA > 50     GFR Evaluation  EGFRCR: 78 , resulted on 12/20/2024          Passed - Medication is active on med list

## 2025-03-20 RX ORDER — METHIMAZOLE 5 MG/1
5 TABLET ORAL DAILY
Qty: 90 TABLET | Refills: 1 | Status: SHIPPED | OUTPATIENT
Start: 2025-03-20

## 2025-03-21 NOTE — TELEPHONE ENCOUNTER
Medication Quantity Refills Start End   semaglutide (OZEMPIC, 0.25 OR 0.5 MG/DOSE,) 2 MG/3ML Subcutaneous Solution Pen-injector (Discontinued) 3 each

## 2025-03-21 NOTE — TELEPHONE ENCOUNTER
Talked to patient he says that he has [employer] up to the end of this month then 04/01/2025 he is going to have [employer] and he have [facility] - [place], [place] - [address] [phone], [phone].

## 2025-03-21 NOTE — TELEPHONE ENCOUNTER
Please call patient and clarify current insurance, and preferred pharmacy.     This order is marked to be sent to [facility], but it is not listed on patient's preferred pharmacy list. Patient has insurance marked as [employer], which uses [facility]       Ozempic ordered, printed 3/14/25 for 6-month supply.  Rosuvastatin - 6 month supply sent 11/4/24 to [facility]  Metformin - year supply at [facility] until 9/2025  Lorsartan-hydrochlorothiazide - year supply at [facility] until 6/2025    If patient has updated insurance and mailorder pharmacy for 2025 then amlodipine will also need to be reordered.    Combining 2 encounters, also requesting:  Current Outpatient Medications:     rosuvastatin 5 MG Oral Tab, Take 1 tablet (5 mg total) by mouth nightly., Disp: 90 tablet, Rfl: 1      metFORMIN HCl 1000 MG Oral Tab, Take 1 tablet (1,000 mg total) by mouth 2 (two) times daily with meals., Disp: 180 tablet, Rfl: 3      LOSARTAN-HYDROCHLOROTHIAZIDE 100-25 MG Oral Tab, TAKE 1 TABLET DAILY, Disp: 90 tablet, Rfl: 3

## 2025-03-24 ENCOUNTER — TELEPHONE (OUTPATIENT)
Dept: INTERNAL MEDICINE CLINIC | Facility: CLINIC | Age: 70
End: 2025-03-24

## 2025-03-24 NOTE — TELEPHONE ENCOUNTER
Current Outpatient Medications:       rosuvastatin 5 MG Oral Tab, Take 1 tablet (5 mg total) by mouth nightly., Disp: 90 tablet, Rfl: 1      metFORMIN HCl 1000 MG Oral Tab, Take 1 tablet (1,000 mg total) by mouth 2 (two) times daily with meals., Disp: 180 tablet, Rfl: 3      LOSARTAN-HYDROCHLOROTHIAZIDE 100-25 MG Oral Tab, TAKE 1 TABLET DAILY, Disp: 90 tablet, Rfl: 3

## 2025-03-25 RX ORDER — ROSUVASTATIN CALCIUM 5 MG/1
5 TABLET, COATED ORAL NIGHTLY
Qty: 90 TABLET | Refills: 3 | Status: SHIPPED | OUTPATIENT
Start: 2025-03-25

## 2025-03-25 RX ORDER — LOSARTAN POTASSIUM AND HYDROCHLOROTHIAZIDE 25; 100 MG/1; MG/1
1 TABLET ORAL DAILY
Qty: 90 TABLET | Refills: 3 | Status: SHIPPED | OUTPATIENT
Start: 2025-03-25

## 2025-03-25 NOTE — TELEPHONE ENCOUNTER
Please review; protocol failed/ has no protocol      Byron Quinn days ago     MD  Talked to patient he says that he has [employer] up to the end of this month then 04/01/2025 he is going to have [employer] and he have [facility] - [place], [place] - [address] -[phone], [phone].

## 2025-03-26 DIAGNOSIS — E05.00 GRAVES DISEASE: ICD-10-CM

## 2025-03-26 NOTE — TELEPHONE ENCOUNTER
Endocrine refill protocol for medications for hypothyroidism and hyperthyroidism    Protocol Criteria:  PASSED Reason: N/A    If all below requirements are met, send a 90-day supply with 1 refill per provider protocol.    Verify appointment with Endocrinology completed in the last 12 months or scheduled in the next 6 months.    Normal TSH result in the past 12 months   Review recent telephone encounters and mychart communications with patient to ensure a dose change has not occurred since last office visit that was not updated in the medication history list     Last completed office visit:1/30/2025 Lisa Byrne MD   Next scheduled Follow up:   Future Appointments   Date Time Provider Department Center   4/10/2025  9:40 AM Lisa Byrne MD [facility]   Last TSH result:   TSH   Date Value Ref Range Status   12/20/2024 3.243 0.550 - 4.780 uIU/mL Final

## 2025-03-30 RX ORDER — METHIMAZOLE 5 MG/1
5 TABLET ORAL DAILY
Qty: 90 TABLET | Refills: 1 | Status: SHIPPED | OUTPATIENT
Start: 2025-03-30

## 2025-04-10 RX ORDER — ROSUVASTATIN CALCIUM 5 MG/1
5 TABLET, COATED ORAL NIGHTLY
Qty: 90 TABLET | Refills: 3 | OUTPATIENT
Start: 2025-04-10

## 2025-04-10 NOTE — TELEPHONE ENCOUNTER
Year supply of refills good until 03/25/2026.       Please see Refill Encounter on 03/21/2025;    Talked to patient he says that he has [employer] up to the end of this month then 04/01/2025 he is going to have [employer] and he have [facility] - [place], [place] - [address] [phone], [phone].     Outpatient Medication Detail     Disp Refills Start End    rosuvastatin 5 MG Oral Tab 90 tablet 3 3/25/2025 --    Sig - Route: Take 1 tablet (5 mg total) by mouth nightly. - Oral    Sent to pharmacy as: Rosuvastatin Calcium 5 MG Oral Tablet (Crestor)    E-Prescribing Status: Receipt confirmed by pharmacy (3/25/2025  2:46 PM CDT)      Pharmacy    [facility] - [place], [place] - [address] [phone], [phone]

## 2025-04-15 ENCOUNTER — LAB REQUISITION (OUTPATIENT)
Dept: LAB | Facility: HOSPITAL | Age: 70
End: 2025-04-15
Payer: MEDICARE

## 2025-04-15 DIAGNOSIS — Z01.89 ENCOUNTER FOR OTHER SPECIFIED SPECIAL EXAMINATIONS: ICD-10-CM

## 2025-04-15 PROCEDURE — 88305 TISSUE EXAM BY PATHOLOGIST: CPT | Performed by: DERMATOLOGY

## 2025-05-22 ENCOUNTER — LAB ENCOUNTER (OUTPATIENT)
Dept: LAB | Age: 70
End: 2025-05-22
Attending: INTERNAL MEDICINE
Payer: MEDICARE

## 2025-05-22 ENCOUNTER — OFFICE VISIT (OUTPATIENT)
Facility: LOCATION | Age: 70
End: 2025-05-22
Payer: MEDICARE

## 2025-05-22 VITALS
SYSTOLIC BLOOD PRESSURE: 134 MMHG | HEIGHT: 69 IN | DIASTOLIC BLOOD PRESSURE: 80 MMHG | HEART RATE: 84 BPM | WEIGHT: 304 LBS | BODY MASS INDEX: 45.03 KG/M2

## 2025-05-22 DIAGNOSIS — E07.9 THYROID DISEASE: ICD-10-CM

## 2025-05-22 DIAGNOSIS — E78.5 DYSLIPIDEMIA ASSOCIATED WITH TYPE 2 DIABETES MELLITUS (HCC): ICD-10-CM

## 2025-05-22 DIAGNOSIS — E05.00 GRAVES DISEASE: ICD-10-CM

## 2025-05-22 DIAGNOSIS — E05.90 HYPERTHYROIDISM: ICD-10-CM

## 2025-05-22 DIAGNOSIS — E11.59 HYPERTENSION ASSOCIATED WITH TYPE 2 DIABETES MELLITUS (HCC): ICD-10-CM

## 2025-05-22 DIAGNOSIS — R73.09 HIGH GLUCOSE LEVEL: ICD-10-CM

## 2025-05-22 DIAGNOSIS — E11.65 UNCONTROLLED TYPE 2 DIABETES MELLITUS WITH HYPERGLYCEMIA (HCC): ICD-10-CM

## 2025-05-22 DIAGNOSIS — E66.813 CLASS 3 SEVERE OBESITY WITH BODY MASS INDEX (BMI) OF 40.0 TO 44.9 IN ADULT, UNSPECIFIED OBESITY TYPE, UNSPECIFIED WHETHER SERIOUS COMORBIDITY PRESENT: ICD-10-CM

## 2025-05-22 DIAGNOSIS — I15.2 HYPERTENSION ASSOCIATED WITH TYPE 2 DIABETES MELLITUS (HCC): ICD-10-CM

## 2025-05-22 DIAGNOSIS — E05.90 HYPERTHYROIDISM: Primary | ICD-10-CM

## 2025-05-22 DIAGNOSIS — E11.69 DYSLIPIDEMIA ASSOCIATED WITH TYPE 2 DIABETES MELLITUS (HCC): ICD-10-CM

## 2025-05-22 LAB
CHOLEST SERPL-MCNC: 117 MG/DL (ref ?–200)
HDLC SERPL-MCNC: 38 MG/DL (ref 40–59)
LDLC SERPL CALC-MCNC: 52 MG/DL (ref ?–100)
NONHDLC SERPL-MCNC: 79 MG/DL (ref ?–130)
TRIGL SERPL-MCNC: 156 MG/DL (ref 30–149)
TSI SER-ACNC: 1.92 UIU/ML (ref 0.55–4.78)
VLDLC SERPL CALC-MCNC: 22 MG/DL (ref 0–30)

## 2025-05-22 PROCEDURE — 84445 ASSAY OF TSI GLOBULIN: CPT | Performed by: INTERNAL MEDICINE

## 2025-05-22 PROCEDURE — G2211 COMPLEX E/M VISIT ADD ON: HCPCS | Performed by: INTERNAL MEDICINE

## 2025-05-22 PROCEDURE — 80061 LIPID PANEL: CPT

## 2025-05-22 PROCEDURE — 99214 OFFICE O/P EST MOD 30 MIN: CPT | Performed by: INTERNAL MEDICINE

## 2025-05-22 PROCEDURE — 3008F BODY MASS INDEX DOCD: CPT | Performed by: INTERNAL MEDICINE

## 2025-05-22 PROCEDURE — 83520 IMMUNOASSAY QUANT NOS NONAB: CPT

## 2025-05-22 PROCEDURE — 3075F SYST BP GE 130 - 139MM HG: CPT | Performed by: INTERNAL MEDICINE

## 2025-05-22 PROCEDURE — 36415 COLL VENOUS BLD VENIPUNCTURE: CPT

## 2025-05-22 PROCEDURE — 84443 ASSAY THYROID STIM HORMONE: CPT

## 2025-05-22 PROCEDURE — 3079F DIAST BP 80-89 MM HG: CPT | Performed by: INTERNAL MEDICINE

## 2025-05-22 NOTE — PROGRESS NOTES
New Patient Evaluation - History and Physical    CONSULT - Reason for Visit:    Graves, hyperthyroid, HTN, DLP. Uncontrolled DM, obese   Requesting Physician:   ..MD Betina Garay Joseph, MD  [address] [place] [id number]    CHIEF COMPLAINT:    Chief Complaint   Patient presents with    Graves     consult      Last completed office visit: 1/30/2025 Lisa Byrne MD   Next scheduled Follow up:   Future Appointments   Date Time Provider Department Center   5/22/2025 10:30 AM Dinesh Slaughter MD [facility]   6/20/2025  9:40 AM Douglas Moffett MD [facility]      HISTORY OF PRESENT ILLNESS:   Jerome Richards is a 69 year old male who presents with  Graves, hyperthyroid, HTN, DLP. Uncontrolled DM, obese   Pt is new to me. Last visit on  1/30/2025 w/ Lisa Varela MD   Abnormal thyroid function tests since 2021.    Graves' Disease and started him on MMI 10mg PO qday  For the last year , has been on MMI 5 mg every day          02/22/24 13:50   Thy Stim Immuno  0.64 (H)             Uncontrolled DM  On MTF 1000 mg bid and ozempic 1 mg /wk  Had yeast infection years ago   UTD on eye exam 2025  Lab Results   Component Value Date    A1C 9.1 (A) 03/14/2025    A1C 8.9 (A) 10/30/2024    A1C 9.4 (A) 04/05/2024    A1C 10.1 (H) 12/18/2023    A1C 7.3 (H) 12/20/2022        BP Meds: amLODIPine Tabs - 5 MG; losartan-hydroCHLOROthiazide Tabs - 100-25 MG      Cholesterol Meds: rosuvastatin Tabs - 5 MG   Cholesterol: 177, done on 11/1/2024.  HDL Cholesterol: 35, done on 11/1/2024.  LDL Cholesterol: 119, done on 11/1/2024.  TriGlycerides 124, done on 11/1/2024.  Micro Albumen/Creatinine:    Lab Results   Component Value Date    MICROALBCREA 4.1 11/01/2024    MICROALBCREA 4.1 12/18/2023    MICROALBCREA 6.7 12/20/2022       ASSESSMENT AND PLAN:  Jerome Richards is a 69 year old male who presents with  Graves, hyperthyroid, HTN, DLP. Uncontrolled DM and  obese     Plan   Labs  today   Labs and follow up in 3 mo   Might stop methimazole dose if antibodies are negative  Might increase statin dose if LDL still above target    Will add jardiance - we discussed side effect. Stop if you get any symptoms or UTI or yeast infection. Do not  if not covered by insruance   .5/22/2025 A1c 8.8    Will follow up with PCP in 1 mo       PAST MEDICAL HISTORY:   Past Medical History:    Acute chest pain    Fracture of left ankle    High blood pressure    Screen for colon cancer    sessile serrated adenoma (1cm) in 2018, no polyps in 2021, repeat CLN in 5years.    Type 2 diabetes mellitus without complication, without long-term current use of insulin (HCC)       PAST SURGICAL HISTORY:   Past Surgical History:   Procedure Laterality Date    Ankle fracture surgery Left 2014    Colonoscopy N/A 2/20/2018    Procedure: COLONOSCOPY;  Surgeon: LAURY Wang MD;  Location: [facility] ENDOSCOPY    Colonoscopy N/A 6/2/2021    Procedure: COLONOSCOPY;  Surgeon: LAURY Wang MD;  Location: [facility] ENDOSCOPY       CURRENT MEDICATIONS:     empagliflozin (JARDIANCE) 25 MG Oral Tab Take 1 tablet (25 mg total) by mouth daily. 90 tablet 0    methIMAzole 5 MG Oral Tab Take 1 tablet (5 mg total) by mouth daily. 90 tablet 1    semaglutide 4 MG/3ML Subcutaneous Solution Pen-injector Inject 1 mg into the skin once a week. 9 mL 1    metFORMIN HCl 1000 MG Oral Tab Take 1 tablet (1,000 mg total) by mouth 2 (two) times daily with meals. 180 tablet 3    rosuvastatin 5 MG Oral Tab Take 1 tablet (5 mg total) by mouth nightly. 90 tablet 3    losartan-hydroCHLOROthiazide 100-25 MG Oral Tab Take 1 tablet by mouth daily. 90 tablet 3    amLODIPine 5 MG Oral Tab Take 1 tablet (5 mg total) by mouth daily. 90 tablet 3       ALLERGIES:  Allergies   Allergen Reactions    Bee Pollen SWELLING       SOCIAL HISTORY:    Social History     Socioeconomic History    Marital status:    Tobacco Use    Smoking status: Never    Smokeless tobacco:  Never   Vaping Use    Vaping status: Never Used   Substance and Sexual Activity    Alcohol use: Yes     Comment: rarely    Drug use: No    Sexual activity: Yes     Partners: Female         FAMILY HISTORY:   History reviewed. No pertinent family history.          PHYSICAL EXAM:   Height: 5' 9\" (175.3 cm) (05/22 1013)  Weight: 304 lb (137.9 kg) (05/22 1013)  BSA (Calculated - sq m): 2.47 sq meters (05/22 1013)  Pulse: 84 (05/22 1013)  BP: 134/80 (05/22 1013)  Temp: --  Do Not Use - Resp Rate: --  SpO2: --    Body mass index is 44.89 kg/m².  Scar on Lt ankle      DATA:     Pertinent data reviewed  No results found.    No results for input(s): \"TSH\", \"T4F\", \"T3F\", \"THYP\" in the last 72 hours.  TSH   Date Value Ref Range Status   12/20/2024 3.243 0.550 - 4.780 uIU/mL Final     Lab Results   Component Value Date    A1C 9.1 (A) 03/14/2025    A1C 8.9 (A) 10/30/2024    A1C 9.4 (A) 04/05/2024    A1C 10.1 (H) 12/18/2023    A1C 7.3 (H) 12/20/2022 02/22/24 13:50   Thy Stim Immuno  0.64 (H)          No results for input(s): \"TSH\", \"T4F\", \"T3F\", \"THYP\" in the last 72 hours.  No results found.    Orders Placed This Encounter   Procedures    Lipid Panel    TSH W Reflex To Free T4    Thyroid Stimulating Immunoglobulin    TSH RECEPTOR ANTIBODY (TBII)    TSH W Reflex To Free T4     Orders Placed This Encounter    Lipid Panel     Standing Status:   Future     Expected Date:   5/22/2025     Expiration Date:   5/22/2026     Release to patient:   Immediate    TSH W Reflex To Free T4     Standing Status:   Future     Expected Date:   5/22/2025     Expiration Date:   5/22/2026     Release to patient:   Immediate    Thyroid Stimulating Immunoglobulin     Release to patient:   Immediate    TSH RECEPTOR ANTIBODY (TBII)     Release to patient:   Immediate    TSH W Reflex To Free T4     Standing Status:   Future     Expected Date:   8/22/2025     Expiration Date:   5/22/2026     Release to patient:   Immediate    empagliflozin (JARDIANCE)  25 MG Oral Tab     Sig: Take 1 tablet (25 mg total) by mouth daily.     Dispense:  90 tablet     Refill:  0          This is a specialized patient consultation in endocrinology and required comprehensive review of prior records, as well as current evaluation, with time required for consideration of complex endocrine issues and consultation. For this visit, I personally interviewed the patient, and family member if accompanied, performed the pertinent parts of the history and physical examination. ROS included screening for appropriate endocrine conditions.   Today's diagnosis and plan were reviewed in detail with the patient who states understanding and agrees with plan. I discussed with the patient possible diagnosis, differential diagnosis, need for work up, treatment options, alternatives and side effects.     Please see note for details about time spent which includes:   · pre-visit preparation  · reviewing records  · face to face time with the patient   · timely documentation of the encounter  · ordering medications/tests  · communication with care team  · care coordination    I appreciate the opportunity to be part of your patient's medical care and will keep you, as the referring and primary physicians, informed about the care of your patient. Please feel free to contact me should you have any questions.    The 21st Century Cures Act makes medical notes like these available to patients in the interest of transparency. Please be advised this is a medical document. Medical documents are intended to carry relevant information, facts as evident, and the clinical opinion of the practitioner. The medical note is intended as peer to peer communication and may appear blunt or direct. It is written in medical language and may contain abbreviations or verbiage that are unfamiliar.   Dinesh Slaughter MD

## 2025-05-22 NOTE — PATIENT INSTRUCTIONS
Labs today   Labs and follow up in 3 mo   Might stop methimazole dose if antibodies are negative  Might increase statin dose if LDL still above target     Will add jardiance - we discussed side effect. Stop if you get any symptoms or UTI or yeast infection. Do not  if not covered by ins[employer]   .5/22/2025 A1c 8.8    Will follow up with PCP in 1 mo   Lab Results   Component Value Date    A1C 9.1 (A) 03/14/2025    A1C 8.9 (A) 10/30/2024    A1C 9.4 (A) 04/05/2024    A1C 10.1 (H) 12/18/2023    A1C 7.3 (H) 12/20/2022 02/22/24 13:50   Thy Stim Immuno  0.64 (H)

## 2025-05-27 LAB — THYROTROPIN REC AB: <1.1 IU/L

## 2025-05-28 LAB — THY STIM IMMUNO: 0.24 IU/L

## 2025-06-04 ENCOUNTER — PATIENT MESSAGE (OUTPATIENT)
Dept: INTERNAL MEDICINE CLINIC | Facility: CLINIC | Age: 70
End: 2025-06-04

## 2025-06-04 NOTE — TELEPHONE ENCOUNTER
Failed protocol as last prescription was over one year ago (03/07/24).  Last office visit was 03/14/25.

## 2025-06-06 RX ORDER — AMLODIPINE BESYLATE 5 MG/1
5 TABLET ORAL DAILY
Qty: 90 TABLET | Refills: 3 | Status: SHIPPED | OUTPATIENT
Start: 2025-06-06

## 2025-06-13 RX ORDER — LOSARTAN POTASSIUM AND HYDROCHLOROTHIAZIDE 25; 100 MG/1; MG/1
1 TABLET ORAL DAILY
Qty: 90 TABLET | Refills: 3 | OUTPATIENT
Start: 2025-06-13

## 2025-08-03 DIAGNOSIS — E05.90 HYPERTHYROIDISM: ICD-10-CM

## 2025-08-03 DIAGNOSIS — E05.00 GRAVES DISEASE: ICD-10-CM

## 2025-08-03 DIAGNOSIS — R73.09 HIGH GLUCOSE LEVEL: ICD-10-CM

## 2025-08-03 DIAGNOSIS — E11.65 UNCONTROLLED TYPE 2 DIABETES MELLITUS WITH HYPERGLYCEMIA (HCC): ICD-10-CM

## 2025-08-03 DIAGNOSIS — E07.9 THYROID DISEASE: ICD-10-CM

## 2025-08-04 RX ORDER — EMPAGLIFLOZIN 25 MG/1
25 TABLET, FILM COATED ORAL DAILY
Qty: 90 TABLET | Refills: 1 | Status: SHIPPED | OUTPATIENT
Start: 2025-08-04

## (undated) DEVICE — SNARE OPTMZ PLPCTM TRP

## (undated) DEVICE — 35 ML SYRINGE REGULAR TIP: Brand: MONOJECT

## (undated) DEVICE — MEDI-VAC NON-CONDUCTIVE SUCTION TUBING 6MM X 1.8M (6FT.) L: Brand: CARDINAL HEALTH

## (undated) DEVICE — MASK PROC W/VISOR ANTIGLARE

## (undated) DEVICE — TRAP MCS 40ML 5IN PLS SCR CAP

## (undated) DEVICE — Device: Brand: CUSTOM PROCEDURE KIT

## (undated) DEVICE — LINE MNTR ADLT SET O2 INTMD

## (undated) DEVICE — ENDOSCOPY PACK - LOWER: Brand: MEDLINE INDUSTRIES, INC.

## (undated) DEVICE — Device: Brand: DEFENDO AIR/WATER/SUCTION AND BIOPSY VALVE

## (undated) DEVICE — SNARE LARIAT HEXAGONAL 10X28

## (undated) DEVICE — REM POLYHESIVE ADULT PATIENT RETURN ELECTRODE: Brand: VALLEYLAB

## (undated) DEVICE — SNARE ENDOSCOPIC 10MM ROUND

## (undated) NOTE — LETTER
Salvatore Cano,     This is the [facility], office of Dr. Douglas Moffett    Thank you for putting your trust in [facility].  Our goal is to deliver the highest quality healthcare and an exceptional patient experience. Upon reviewing of your medical record shows you are due for the following:       Annual Medicare Physical Exam  Diabetic A1C follow up         Please call [phone] to schedule your appointment or schedule online via [email or web address].     If you changed to a new provider at another facility, please notify the clinic to update your records.    If you had any recent testing at another facility, please have your results faxed to our office at [phone].           Thank you and have a great day!

## (undated) NOTE — LETTER
12/21/2020    Marvin Klein [address]            Dear Gertrude Campbell,      Our records indicate that you are due for an appointment for a Colonoscopy in February 2021, or shortly there after, with Munir Carrera

## (undated) NOTE — LETTER
[facility], [address] [address]  [phone]                02/23/18      Lan Richards  [address] [place] [address]        Dear Jarvis Batista,    I reviewed the pathology report fr